# Patient Record
Sex: FEMALE | Race: OTHER | Employment: UNEMPLOYED | URBAN - METROPOLITAN AREA
[De-identification: names, ages, dates, MRNs, and addresses within clinical notes are randomized per-mention and may not be internally consistent; named-entity substitution may affect disease eponyms.]

---

## 2021-12-07 NOTE — PROGRESS NOTES
PATIENT IDENTIFICATION  Name: Carolina Troy  MRN: [id number]    Diagnoses:   Anxiety  (primary encounter diagnosis)  Fibromyalgia    SUBJECTIVE:  Carolina Troy is a 73year old female who presents for Establishment of care and anxiety follow up.       per NG:; 40 week 7 lb(s) Female   •       per N week 7 lb(s) 15 oz Male   •       per N week 7 lb(s) 15 oz Female   •       per N week 8 lb(s) Male     Social History    Tobacco Use      Smoking status: gabapentin alone. Per up to date guidelines, may benefit from the addition of SNRI (venlafaxine), will f/u in one week, may consider increasing venlafaxine dose at that time if tolerating well.  Will need to also consider decreasing alprazolam and/or gabape

## 2021-12-14 NOTE — PROGRESS NOTES
HPI:   Wilfred Garcia is a 73year old female who presents for a Medicare Subsequent Annual Wellness visit (Pt already had Initial Annual Wellness). No topic due editable text        She has been screened for Falls and is low risk.       Cognitive Asse for: CHOLEST, HDL, LDL, TRIG       Last Chemistry Labs:   No results found for: AST, ALT, CA, ALB, TSH, CREATSERUM, GLU     CBC  (most recent labs)   No results found for: WBC, HGB, PLT     ALLERGIES:   She has No Known Allergies.     CURRENT MEDICATIONS: difficulty urinating and dysuria. Musculoskeletal: Positive for back pain (hx of fibromyalgia). Skin: Negative for rash and wound. Allergic/Immunologic: Negative for environmental allergies and food allergies.    Neurological: Positive for headaches ( ameya ([id number]) 10/16/2018        ASSESSMENT AND OTHER RELEVANT CHRONIC CONDITIONS:   Hung Chamberlain is a 73year old female who presents for a Medicare Assessment.      PLAN SUMMARY:   Diagnoses and all orders for this visit:    Encounter for [facility] than 10 pounds without trying?: 2 - No  Has your appetite been poor?: No  How does the patient maintain a good energy level?: Other  How would you describe your daily physical activity?: Moderate  How would you describe your current health state?: Alex Conteh density screening    Covered every 2 years after age 72 if diagnosed with risk of osteoporosis or estrogen deficiency. Covered yearly for long-term glucocorticoid medication use (Steroids) No results found for this or any previous visit.       DEXA Scan

## 2021-12-14 NOTE — PATIENT INSTRUCTIONS
Fernando Dhillon's SCREENING SCHEDULE   Tests on this list are recommended by your physician but may not be covered, or covered at this frequency, by your insurer. Please check with your insurance carrier before scheduling to verify coverage.    PREVENT risk -  No recommendations at this time    Chlamydia Annually if high risk -  No recommendations at this time   Screening Mammogram    Mammogram     Recommend annually for all female patients aged 36 and older    One baseline mammogram covered for patients using their home computer and printer. (the forms are also available in [facility])  [email or web address]  This link also has information from the [facility] regarding Advance Directives.

## 2022-01-05 ENCOUNTER — TELEPHONE (OUTPATIENT)
Dept: INTERNAL MEDICINE CLINIC | Facility: CLINIC | Age: 67
End: 2022-01-05

## 2022-01-05 NOTE — TELEPHONE ENCOUNTER
Pt came in to the clinic to ask if she can get a refill on Alprazolam 0.5mg. Pt states she sometimes takes 2 instead of 1. If it can be sent asap to the pharmacy.

## 2022-01-06 NOTE — PROGRESS NOTES
Telehealth outside of [address] Verbal Consent   I conducted a telehealth visit with Margaret Hernandez today, 01/06/22, which was completed using two-way, real-time interactive audio communication.  This has been done in good neo to provide angelika feels like her anxiety is not well controlled. Willing to try another SSRI or SNRI again. Never went to see psych and has not been scheduled to see them either. Previously tried venlafaxine last month for a total of 3 days.    Has previously tried cy Take 1 capsule (40 mg total) by mouth daily. 90 capsule 1   • gabapentin 300 MG Oral Cap Take 1 capsule (300 mg total) by mouth 3 (three) times daily.  90 capsule 2   • atorvastatin 10 MG Oral Tab      • Cholecalciferol (VITAMIN D3) 1.25 MG (58297 UT) Oral

## 2022-01-06 NOTE — TELEPHONE ENCOUNTER
Contacted pt and informed needs appt to discuss this further.    Scheduled virtual today at [place] [address]

## 2022-01-10 NOTE — PROGRESS NOTES
Dear Dr. Daryn Puga:    I saw your patient Mercedes Garcia in consultation this afternoon at your request, for evaluation of chronic musculoskeletal pain and positive BRIDGER.   As you know, she is a 78-year-old woman who had fusion at C5-6, 13 years ago for hernia Her appetite can be poor. No weight loss. No rash. Her skin is not sensitive to the sun. Her hair has thinned some. No internal eye inflammation, oral or nasal ulcers, of adenopathy. No shortness of breath. She gets chest pain when she is anxious. gabapentin. She will try cyclobenzaprine 5 to 10 mg before bedtime. We discussed the process. Physical therapy. She will follow-up in 1 month. 4.  Anxiety, hypertension, prediabetes, high cholesterol, obesity.       Thank you for inviting me to parti

## 2022-01-15 NOTE — PROGRESS NOTES
PATIENT IDENTIFICATION  Name: Jude Albright  MRN: [id number]    Diagnoses:   Other chronic pain  (primary encounter diagnosis)  Anxiety    SUBJECTIVE:  Jude Albright is a 73year old female who presents for f/u for anxiety/chronic pain.      Seen by RACHAEL Release Take 1 capsule (40 mg total) by mouth daily. 90 capsule 1   • gabapentin 300 MG Oral Cap Take 1 capsule (300 mg total) by mouth 3 (three) times daily.  90 capsule 2   • atorvastatin 10 MG Oral Tab      • Cholecalciferol (VITAMIN D3) 1.25 MG (99872 U

## 2022-02-01 ENCOUNTER — TELEPHONE (OUTPATIENT)
Dept: INTERNAL MEDICINE CLINIC | Facility: CLINIC | Age: 67
End: 2022-02-01

## 2022-02-01 NOTE — TELEPHONE ENCOUNTER
Pt would like to know if she can get a refill for Alprazolam 0.5mg. Pt uses [facility] in [place].

## 2022-02-02 ENCOUNTER — VIRTUAL PHONE E/M (OUTPATIENT)
Dept: INTERNAL MEDICINE CLINIC | Facility: CLINIC | Age: 67
End: 2022-02-02
Payer: MEDICARE

## 2022-02-02 DIAGNOSIS — M79.7 FIBROMYALGIA: ICD-10-CM

## 2022-02-02 DIAGNOSIS — F32.A ANXIETY AND DEPRESSION: ICD-10-CM

## 2022-02-02 DIAGNOSIS — G89.29 OTHER CHRONIC PAIN: Primary | ICD-10-CM

## 2022-02-02 DIAGNOSIS — F41.9 ANXIETY AND DEPRESSION: ICD-10-CM

## 2022-02-02 PROCEDURE — 99443 PHONE E/M BY PHYS 21-30 MIN: CPT | Performed by: FAMILY MEDICINE

## 2022-02-02 RX ORDER — DULOXETIN HYDROCHLORIDE 30 MG/1
30 CAPSULE, DELAYED RELEASE ORAL DAILY
Qty: 30 CAPSULE | Refills: 3 | Status: SHIPPED | OUTPATIENT
Start: 2022-02-02 | End: 2022-04-04 | Stop reason: DRUGHIGH

## 2022-02-02 RX ORDER — ALPRAZOLAM 0.5 MG/1
0.5 TABLET ORAL NIGHTLY PRN
Qty: 30 TABLET | Refills: 0 | Status: SHIPPED | OUTPATIENT
Start: 2022-02-02 | End: 2022-03-02

## 2022-02-09 ENCOUNTER — OFFICE VISIT (OUTPATIENT)
Dept: INTERNAL MEDICINE CLINIC | Facility: CLINIC | Age: 67
End: 2022-02-09
Payer: MEDICARE

## 2022-02-09 VITALS
DIASTOLIC BLOOD PRESSURE: 80 MMHG | SYSTOLIC BLOOD PRESSURE: 128 MMHG | BODY MASS INDEX: 28.16 KG/M2 | OXYGEN SATURATION: 98 % | HEART RATE: 88 BPM | WEIGHT: 169 LBS | HEIGHT: 65 IN

## 2022-02-09 DIAGNOSIS — M79.7 FIBROMYALGIA: ICD-10-CM

## 2022-02-09 DIAGNOSIS — F33.0 MILD RECURRENT MAJOR DEPRESSION (HCC): Chronic | ICD-10-CM

## 2022-02-09 DIAGNOSIS — F41.9 ANXIETY: ICD-10-CM

## 2022-02-09 DIAGNOSIS — G89.29 OTHER CHRONIC PAIN: Primary | ICD-10-CM

## 2022-02-09 PROCEDURE — 99213 OFFICE O/P EST LOW 20 MIN: CPT | Performed by: FAMILY MEDICINE

## 2022-02-09 PROCEDURE — 3074F SYST BP LT 130 MM HG: CPT | Performed by: FAMILY MEDICINE

## 2022-02-09 PROCEDURE — 3008F BODY MASS INDEX DOCD: CPT | Performed by: FAMILY MEDICINE

## 2022-02-09 PROCEDURE — 3079F DIAST BP 80-89 MM HG: CPT | Performed by: FAMILY MEDICINE

## 2022-02-09 RX ORDER — ATORVASTATIN CALCIUM 10 MG/1
10 TABLET, FILM COATED ORAL NIGHTLY
Qty: 90 TABLET | Refills: 1 | Status: SHIPPED | OUTPATIENT
Start: 2022-02-09

## 2022-02-18 ENCOUNTER — OFFICE VISIT (OUTPATIENT)
Dept: RHEUMATOLOGY | Facility: CLINIC | Age: 67
End: 2022-02-18
Payer: MEDICARE

## 2022-02-18 VITALS
WEIGHT: 171 LBS | BODY MASS INDEX: 28 KG/M2 | SYSTOLIC BLOOD PRESSURE: 152 MMHG | DIASTOLIC BLOOD PRESSURE: 79 MMHG | HEART RATE: 73 BPM

## 2022-02-18 DIAGNOSIS — M15.9 PRIMARY OSTEOARTHRITIS INVOLVING MULTIPLE JOINTS: ICD-10-CM

## 2022-02-18 DIAGNOSIS — M79.7 FIBROMYALGIA: Primary | ICD-10-CM

## 2022-02-18 PROCEDURE — 3078F DIAST BP <80 MM HG: CPT | Performed by: INTERNAL MEDICINE

## 2022-02-18 PROCEDURE — 99213 OFFICE O/P EST LOW 20 MIN: CPT | Performed by: INTERNAL MEDICINE

## 2022-02-18 PROCEDURE — 3077F SYST BP >= 140 MM HG: CPT | Performed by: INTERNAL MEDICINE

## 2022-03-02 ENCOUNTER — OFFICE VISIT (OUTPATIENT)
Dept: INTERNAL MEDICINE CLINIC | Facility: CLINIC | Age: 67
End: 2022-03-02
Payer: MEDICARE

## 2022-03-02 VITALS
WEIGHT: 169 LBS | BODY MASS INDEX: 28.16 KG/M2 | HEIGHT: 65 IN | SYSTOLIC BLOOD PRESSURE: 118 MMHG | DIASTOLIC BLOOD PRESSURE: 70 MMHG | HEART RATE: 61 BPM | OXYGEN SATURATION: 97 %

## 2022-03-02 DIAGNOSIS — F41.9 ANXIETY: ICD-10-CM

## 2022-03-02 DIAGNOSIS — M79.7 FIBROMYALGIA: Primary | ICD-10-CM

## 2022-03-02 DIAGNOSIS — F33.0 MILD RECURRENT MAJOR DEPRESSION (HCC): ICD-10-CM

## 2022-03-02 PROCEDURE — 3078F DIAST BP <80 MM HG: CPT | Performed by: FAMILY MEDICINE

## 2022-03-02 PROCEDURE — 99214 OFFICE O/P EST MOD 30 MIN: CPT | Performed by: FAMILY MEDICINE

## 2022-03-02 PROCEDURE — 3074F SYST BP LT 130 MM HG: CPT | Performed by: FAMILY MEDICINE

## 2022-03-02 PROCEDURE — 3008F BODY MASS INDEX DOCD: CPT | Performed by: FAMILY MEDICINE

## 2022-03-02 RX ORDER — ALPRAZOLAM 0.25 MG/1
0.25 TABLET ORAL NIGHTLY PRN
Qty: 60 TABLET | Refills: 0 | Status: SHIPPED | OUTPATIENT
Start: 2022-03-02

## 2022-03-29 ENCOUNTER — ORDER TRANSCRIPTION (OUTPATIENT)
Dept: PHYSICAL THERAPY | Facility: HOSPITAL | Age: 67
End: 2022-03-29

## 2022-04-01 ENCOUNTER — OFFICE VISIT (OUTPATIENT)
Dept: GASTROENTEROLOGY | Facility: CLINIC | Age: 67
End: 2022-04-01
Payer: MEDICARE

## 2022-04-01 ENCOUNTER — TELEPHONE (OUTPATIENT)
Dept: GASTROENTEROLOGY | Facility: CLINIC | Age: 67
End: 2022-04-01

## 2022-04-01 VITALS
SYSTOLIC BLOOD PRESSURE: 122 MMHG | BODY MASS INDEX: 28.82 KG/M2 | WEIGHT: 173 LBS | HEART RATE: 90 BPM | DIASTOLIC BLOOD PRESSURE: 79 MMHG | HEIGHT: 65 IN

## 2022-04-01 DIAGNOSIS — Z12.11 ENCOUNTER FOR COLORECTAL CANCER SCREENING: Primary | ICD-10-CM

## 2022-04-01 DIAGNOSIS — K64.9 HEMORRHOIDS, UNSPECIFIED HEMORRHOID TYPE: ICD-10-CM

## 2022-04-01 DIAGNOSIS — Z12.12 ENCOUNTER FOR COLORECTAL CANCER SCREENING: Primary | ICD-10-CM

## 2022-04-01 DIAGNOSIS — Z86.010 PERSONAL HISTORY OF COLONIC POLYPS: ICD-10-CM

## 2022-04-01 PROCEDURE — 3074F SYST BP LT 130 MM HG: CPT | Performed by: INTERNAL MEDICINE

## 2022-04-01 PROCEDURE — 3078F DIAST BP <80 MM HG: CPT | Performed by: INTERNAL MEDICINE

## 2022-04-01 PROCEDURE — 3008F BODY MASS INDEX DOCD: CPT | Performed by: INTERNAL MEDICINE

## 2022-04-01 PROCEDURE — 99203 OFFICE O/P NEW LOW 30 MIN: CPT | Performed by: INTERNAL MEDICINE

## 2022-04-01 NOTE — TELEPHONE ENCOUNTER
Scheduled for:  Colonoscopy [address]  Provider Name:  Dr. Anibal Ernandez  Date:  5/25/2022  Location:  [facility]  Sedation:  MAC  Time:  9:30 am, (pt is aware that [facility] will call the day before to confirm arrival time)  Prep:  Golytely  Meds/Allergies Reconciled?:  Physician reviewed  Diagnosis with codes:  Screening for colon cancer Z12.11 & Hx of colon polyps Z86.010  Was patient informed to call insurance with codes (Y/N):  Yes  Referral sent?:  Yes  [facility] or [facility] [address] notified?:  Electronic case request was sent to Tomstewmaryan Mccormack via [employer]. Medication Orders:  N/A  Misc Orders:  Patient was informed that they will need a COVID 19 test prior to their procedure. Patient verbally understood & will await a phone call from [facility] to schedule. Further instructions given by staff: I provided patient with Welsh prep instruction sheet.

## 2022-04-04 ENCOUNTER — OFFICE VISIT (OUTPATIENT)
Dept: INTERNAL MEDICINE CLINIC | Facility: CLINIC | Age: 67
End: 2022-04-04
Payer: MEDICARE

## 2022-04-04 VITALS
OXYGEN SATURATION: 96 % | BODY MASS INDEX: 28.32 KG/M2 | SYSTOLIC BLOOD PRESSURE: 134 MMHG | WEIGHT: 170 LBS | HEIGHT: 65 IN | RESPIRATION RATE: 17 BRPM | DIASTOLIC BLOOD PRESSURE: 72 MMHG | HEART RATE: 77 BPM

## 2022-04-04 DIAGNOSIS — E55.9 VITAMIN D DEFICIENCY: ICD-10-CM

## 2022-04-04 DIAGNOSIS — F33.0 MILD RECURRENT MAJOR DEPRESSION (HCC): Chronic | ICD-10-CM

## 2022-04-04 DIAGNOSIS — Z00.00 ENCOUNTER FOR ANNUAL HEALTH EXAMINATION: Primary | ICD-10-CM

## 2022-04-04 DIAGNOSIS — M79.7 FIBROMYALGIA: ICD-10-CM

## 2022-04-04 DIAGNOSIS — R73.01 ABNORMAL FASTING GLUCOSE: ICD-10-CM

## 2022-04-04 DIAGNOSIS — F41.9 ANXIETY: ICD-10-CM

## 2022-04-04 DIAGNOSIS — Z78.0 POSTMENOPAUSAL: ICD-10-CM

## 2022-04-04 DIAGNOSIS — Z13.6 SCREENING FOR CARDIOVASCULAR CONDITION: ICD-10-CM

## 2022-04-04 DIAGNOSIS — Z12.31 VISIT FOR SCREENING MAMMOGRAM: ICD-10-CM

## 2022-04-04 DIAGNOSIS — I10 ESSENTIAL HYPERTENSION: ICD-10-CM

## 2022-04-04 DIAGNOSIS — Z13.220 SCREENING FOR LIPID DISORDERS: ICD-10-CM

## 2022-04-04 PROCEDURE — 3078F DIAST BP <80 MM HG: CPT | Performed by: FAMILY MEDICINE

## 2022-04-04 PROCEDURE — 3008F BODY MASS INDEX DOCD: CPT | Performed by: FAMILY MEDICINE

## 2022-04-04 PROCEDURE — G0438 PPPS, INITIAL VISIT: HCPCS | Performed by: FAMILY MEDICINE

## 2022-04-04 PROCEDURE — 3075F SYST BP GE 130 - 139MM HG: CPT | Performed by: FAMILY MEDICINE

## 2022-04-04 PROCEDURE — 96160 PT-FOCUSED HLTH RISK ASSMT: CPT | Performed by: FAMILY MEDICINE

## 2022-04-04 PROCEDURE — 99397 PER PM REEVAL EST PAT 65+ YR: CPT | Performed by: FAMILY MEDICINE

## 2022-04-05 LAB
ABSOLUTE BASOPHILS: 29 CELLS/UL (ref 0–200)
ABSOLUTE EOSINOPHILS: 221 CELLS/UL (ref 15–500)
ABSOLUTE LYMPHOCYTES: 1695 CELLS/UL (ref 850–3900)
ABSOLUTE MONOCYTES: 436 CELLS/UL (ref 200–950)
ABSOLUTE NEUTROPHILS: 2519 CELLS/UL (ref 1500–7800)
ALBUMIN/GLOBULIN RATIO: 1.7 (CALC) (ref 1–2.5)
ALBUMIN: 4.6 G/DL (ref 3.6–5.1)
ALKALINE PHOSPHATASE: 92 U/L (ref 37–153)
ALT: 21 U/L (ref 6–29)
AST: 20 U/L (ref 10–35)
BASOPHILS: 0.6 %
BILIRUBIN, TOTAL: 0.6 MG/DL (ref 0.2–1.2)
BUN: 17 MG/DL (ref 7–25)
CALCIUM: 10.1 MG/DL (ref 8.6–10.4)
CARBON DIOXIDE: 30 MMOL/L (ref 20–32)
CHLORIDE: 105 MMOL/L (ref 98–110)
CHOL/HDLC RATIO: 4.1 (CALC)
CHOLESTEROL, TOTAL: 239 MG/DL
CREATININE: 0.72 MG/DL (ref 0.5–0.99)
EGFR IF AFRICN AM: 102 ML/MIN/1.73M2
EGFR IF NONAFRICN AM: 88 ML/MIN/1.73M2
EOSINOPHILS: 4.5 %
GLOBULIN: 2.7 G/DL (CALC) (ref 1.9–3.7)
GLUCOSE: 128 MG/DL (ref 65–99)
HDL CHOLESTEROL: 58 MG/DL
HEMATOCRIT: 41 % (ref 35–45)
HEMOGLOBIN A1C: 6.4 % OF TOTAL HGB
HEMOGLOBIN: 13.8 G/DL (ref 11.7–15.5)
LDL-CHOLESTEROL: 145 MG/DL (CALC)
LYMPHOCYTES: 34.6 %
MCH: 27.9 PG (ref 27–33)
MCHC: 33.7 G/DL (ref 32–36)
MCV: 82.8 FL (ref 80–100)
MONOCYTES: 8.9 %
MPV: 10.4 FL (ref 7.5–12.5)
NEUTROPHILS: 51.4 %
NON-HDL CHOLESTEROL: 181 MG/DL (CALC)
PLATELET COUNT: 184 THOUSAND/UL (ref 140–400)
POTASSIUM: 4.2 MMOL/L (ref 3.5–5.3)
PROTEIN, TOTAL: 7.3 G/DL (ref 6.1–8.1)
RDW: 12.5 % (ref 11–15)
RED BLOOD CELL COUNT: 4.95 MILLION/UL (ref 3.8–5.1)
SODIUM: 141 MMOL/L (ref 135–146)
TRIGLYCERIDES: 223 MG/DL
VITAMIN D, 25-OH, TOTAL: 56 NG/ML (ref 30–100)
WHITE BLOOD CELL COUNT: 4.9 THOUSAND/UL (ref 3.8–10.8)

## 2022-05-02 ENCOUNTER — TELEPHONE (OUTPATIENT)
Dept: PHYSICAL THERAPY | Facility: HOSPITAL | Age: 67
End: 2022-05-02

## 2022-05-03 ENCOUNTER — APPOINTMENT (OUTPATIENT)
Dept: PHYSICAL THERAPY | Age: 67
End: 2022-05-03

## 2022-05-03 ENCOUNTER — TELEPHONE (OUTPATIENT)
Dept: PHYSICAL THERAPY | Facility: HOSPITAL | Age: 67
End: 2022-05-03

## 2022-05-05 ENCOUNTER — APPOINTMENT (OUTPATIENT)
Dept: PHYSICAL THERAPY | Age: 67
End: 2022-05-05

## 2022-05-05 ENCOUNTER — OFFICE VISIT (OUTPATIENT)
Dept: INTERNAL MEDICINE CLINIC | Facility: CLINIC | Age: 67
End: 2022-05-05
Payer: MEDICARE

## 2022-05-05 VITALS
HEIGHT: 65 IN | WEIGHT: 170 LBS | SYSTOLIC BLOOD PRESSURE: 138 MMHG | DIASTOLIC BLOOD PRESSURE: 80 MMHG | BODY MASS INDEX: 28.32 KG/M2 | HEART RATE: 75 BPM

## 2022-05-05 DIAGNOSIS — F33.0 MILD RECURRENT MAJOR DEPRESSION (HCC): ICD-10-CM

## 2022-05-05 DIAGNOSIS — F41.9 ANXIETY: ICD-10-CM

## 2022-05-05 DIAGNOSIS — M79.7 FIBROMYALGIA: Primary | ICD-10-CM

## 2022-05-05 PROCEDURE — 99214 OFFICE O/P EST MOD 30 MIN: CPT | Performed by: FAMILY MEDICINE

## 2022-05-05 PROCEDURE — 3079F DIAST BP 80-89 MM HG: CPT | Performed by: FAMILY MEDICINE

## 2022-05-05 PROCEDURE — 3008F BODY MASS INDEX DOCD: CPT | Performed by: FAMILY MEDICINE

## 2022-05-05 PROCEDURE — 3075F SYST BP GE 130 - 139MM HG: CPT | Performed by: FAMILY MEDICINE

## 2022-05-05 RX ORDER — DULOXETIN HYDROCHLORIDE 60 MG/1
60 CAPSULE, DELAYED RELEASE ORAL DAILY
Qty: 90 CAPSULE | Refills: 1 | Status: SHIPPED | OUTPATIENT
Start: 2022-05-05

## 2022-05-05 RX ORDER — DULOXETIN HYDROCHLORIDE 20 MG/1
CAPSULE, DELAYED RELEASE ORAL
COMMUNITY
Start: 2022-05-01 | End: 2022-05-05 | Stop reason: DRUGHIGH

## 2022-05-05 RX ORDER — ALPRAZOLAM 0.25 MG/1
0.25 TABLET ORAL NIGHTLY PRN
Qty: 30 TABLET | Refills: 0 | Status: SHIPPED | OUTPATIENT
Start: 2022-05-05

## 2022-05-10 ENCOUNTER — APPOINTMENT (OUTPATIENT)
Dept: PHYSICAL THERAPY | Age: 67
End: 2022-05-10
Attending: PHYSICAL THERAPIST

## 2022-05-12 ENCOUNTER — APPOINTMENT (OUTPATIENT)
Dept: PHYSICAL THERAPY | Age: 67
End: 2022-05-12
Attending: PHYSICAL THERAPIST

## 2022-05-17 ENCOUNTER — APPOINTMENT (OUTPATIENT)
Dept: PHYSICAL THERAPY | Age: 67
End: 2022-05-17
Attending: PHYSICAL THERAPIST

## 2022-05-19 ENCOUNTER — APPOINTMENT (OUTPATIENT)
Dept: PHYSICAL THERAPY | Age: 67
End: 2022-05-19
Attending: PHYSICAL THERAPIST

## 2022-05-24 ENCOUNTER — APPOINTMENT (OUTPATIENT)
Dept: PHYSICAL THERAPY | Age: 67
End: 2022-05-24
Attending: PHYSICAL THERAPIST

## 2022-05-25 ENCOUNTER — SURGERY CENTER DOCUMENTATION (OUTPATIENT)
Dept: SURGERY | Age: 67
End: 2022-05-25

## 2022-05-25 ENCOUNTER — LAB REQUISITION (OUTPATIENT)
Dept: SURGERY | Age: 67
End: 2022-05-25
Payer: MEDICARE

## 2022-05-25 DIAGNOSIS — Z12.11 ENCOUNTER FOR COLORECTAL CANCER SCREENING: ICD-10-CM

## 2022-05-25 DIAGNOSIS — Z86.010 PERSONAL HISTORY OF COLONIC POLYPS: ICD-10-CM

## 2022-05-25 DIAGNOSIS — Z12.12 ENCOUNTER FOR COLORECTAL CANCER SCREENING: ICD-10-CM

## 2022-05-25 DIAGNOSIS — Z12.11 SPECIAL SCREENING FOR MALIGNANT NEOPLASMS, COLON: ICD-10-CM

## 2022-05-25 PROCEDURE — 45380 COLONOSCOPY AND BIOPSY: CPT | Performed by: INTERNAL MEDICINE

## 2022-05-25 PROCEDURE — 88305 TISSUE EXAM BY PATHOLOGIST: CPT | Performed by: INTERNAL MEDICINE

## 2022-05-26 ENCOUNTER — APPOINTMENT (OUTPATIENT)
Dept: PHYSICAL THERAPY | Age: 67
End: 2022-05-26
Attending: PHYSICAL THERAPIST

## 2022-05-27 ENCOUNTER — TELEPHONE (OUTPATIENT)
Dept: GASTROENTEROLOGY | Facility: CLINIC | Age: 67
End: 2022-05-27

## 2022-05-27 ENCOUNTER — APPOINTMENT (OUTPATIENT)
Dept: PHYSICAL THERAPY | Age: 67
End: 2022-05-27
Attending: PHYSICAL THERAPIST

## 2022-05-27 NOTE — TELEPHONE ENCOUNTER
Kandis Perry MD  [facility] Clinical Staff  GI staff: please place recall for colonoscopy in 7 years       Results letter printed and mailed out to patient. Health maintenance updated to reflect 7 year colonoscopy recall. Patient outreach entered for 7 year colonoscopy recall. Done on 5/25/2022; due on 5/25/2029.

## 2022-05-31 ENCOUNTER — OFFICE VISIT (OUTPATIENT)
Dept: INTERNAL MEDICINE CLINIC | Facility: CLINIC | Age: 67
End: 2022-05-31
Payer: MEDICARE

## 2022-05-31 VITALS
OXYGEN SATURATION: 98 % | SYSTOLIC BLOOD PRESSURE: 112 MMHG | DIASTOLIC BLOOD PRESSURE: 60 MMHG | BODY MASS INDEX: 28.32 KG/M2 | RESPIRATION RATE: 16 BRPM | HEIGHT: 65 IN | WEIGHT: 170 LBS | HEART RATE: 79 BPM

## 2022-05-31 DIAGNOSIS — R07.89 ATYPICAL CHEST PAIN: ICD-10-CM

## 2022-05-31 DIAGNOSIS — I49.9 IRREGULAR CARDIAC RHYTHM: Primary | ICD-10-CM

## 2022-05-31 PROCEDURE — 3008F BODY MASS INDEX DOCD: CPT | Performed by: FAMILY MEDICINE

## 2022-05-31 PROCEDURE — 3074F SYST BP LT 130 MM HG: CPT | Performed by: FAMILY MEDICINE

## 2022-05-31 PROCEDURE — 3078F DIAST BP <80 MM HG: CPT | Performed by: FAMILY MEDICINE

## 2022-05-31 PROCEDURE — 99214 OFFICE O/P EST MOD 30 MIN: CPT | Performed by: FAMILY MEDICINE

## 2022-05-31 RX ORDER — ALPRAZOLAM 0.25 MG/1
0.25 TABLET ORAL NIGHTLY PRN
Qty: 30 TABLET | Refills: 0 | Status: SHIPPED | OUTPATIENT
Start: 2022-05-31

## 2022-06-23 DIAGNOSIS — I10 ESSENTIAL HYPERTENSION: ICD-10-CM

## 2022-06-24 RX ORDER — METOPROLOL SUCCINATE 50 MG/1
50 TABLET, EXTENDED RELEASE ORAL DAILY
Qty: 90 TABLET | Refills: 1 | Status: SHIPPED | OUTPATIENT
Start: 2022-06-24

## 2022-06-24 RX ORDER — ATORVASTATIN CALCIUM 10 MG/1
10 TABLET, FILM COATED ORAL NIGHTLY
Qty: 90 TABLET | Refills: 1 | OUTPATIENT
Start: 2022-06-24

## 2022-06-27 ENCOUNTER — OFFICE VISIT (OUTPATIENT)
Dept: INTERNAL MEDICINE CLINIC | Facility: CLINIC | Age: 67
End: 2022-06-27
Payer: MEDICARE

## 2022-06-27 VITALS
RESPIRATION RATE: 15 BRPM | BODY MASS INDEX: 28.32 KG/M2 | SYSTOLIC BLOOD PRESSURE: 120 MMHG | WEIGHT: 170 LBS | HEART RATE: 81 BPM | DIASTOLIC BLOOD PRESSURE: 76 MMHG | HEIGHT: 65 IN

## 2022-06-27 DIAGNOSIS — F41.9 ANXIETY: ICD-10-CM

## 2022-06-27 DIAGNOSIS — G44.209 TENSION HEADACHE: ICD-10-CM

## 2022-06-27 DIAGNOSIS — R73.01 ABNORMAL FASTING GLUCOSE: ICD-10-CM

## 2022-06-27 DIAGNOSIS — E78.2 MIXED HYPERLIPIDEMIA: Primary | ICD-10-CM

## 2022-06-27 DIAGNOSIS — M79.7 FIBROMYALGIA: ICD-10-CM

## 2022-06-27 DIAGNOSIS — F51.04 PSYCHOPHYSIOLOGICAL INSOMNIA: ICD-10-CM

## 2022-06-27 PROCEDURE — 3078F DIAST BP <80 MM HG: CPT | Performed by: FAMILY MEDICINE

## 2022-06-27 PROCEDURE — 3008F BODY MASS INDEX DOCD: CPT | Performed by: FAMILY MEDICINE

## 2022-06-27 PROCEDURE — 3074F SYST BP LT 130 MM HG: CPT | Performed by: FAMILY MEDICINE

## 2022-06-27 PROCEDURE — 99214 OFFICE O/P EST MOD 30 MIN: CPT | Performed by: FAMILY MEDICINE

## 2022-06-27 RX ORDER — CYCLOBENZAPRINE HCL 5 MG
TABLET ORAL
Qty: 8 TABLET | Refills: 2 | Status: SHIPPED | OUTPATIENT
Start: 2022-06-27

## 2022-06-27 RX ORDER — ALPRAZOLAM 0.25 MG/1
0.25 TABLET ORAL 2 TIMES DAILY PRN
Qty: 60 TABLET | Refills: 0 | Status: SHIPPED | OUTPATIENT
Start: 2022-06-27

## 2022-06-27 RX ORDER — GABAPENTIN 300 MG/1
300 CAPSULE ORAL 3 TIMES DAILY
Qty: 90 CAPSULE | Refills: 2 | Status: SHIPPED | OUTPATIENT
Start: 2022-06-27

## 2022-06-27 RX ORDER — DULOXETIN HYDROCHLORIDE 60 MG/1
60 CAPSULE, DELAYED RELEASE ORAL DAILY
Qty: 90 CAPSULE | Refills: 1 | Status: SHIPPED | OUTPATIENT
Start: 2022-06-27

## 2022-08-09 RX ORDER — ALPRAZOLAM 0.25 MG/1
0.25 TABLET ORAL 2 TIMES DAILY PRN
Qty: 60 TABLET | Refills: 0 | OUTPATIENT
Start: 2022-08-09

## 2022-09-07 NOTE — TELEPHONE ENCOUNTER
Pt called and is requesting a refill of:    ALPRAZolam 0.25 MG Oral Tab    She is making an appt today with Dr. Shemar Baigr.

## 2022-09-08 NOTE — TELEPHONE ENCOUNTER
Last OV:6-27-22  Last refill:6-27-22    Next Appt:    With [address] Adrianne Gillette MD)  09/21/2022 at 2:40 PM

## 2022-09-09 RX ORDER — ALPRAZOLAM 0.25 MG/1
0.25 TABLET ORAL NIGHTLY PRN
Qty: 30 TABLET | Refills: 0 | OUTPATIENT
Start: 2022-09-09

## 2022-09-21 ENCOUNTER — TELEPHONE (OUTPATIENT)
Dept: INTERNAL MEDICINE CLINIC | Facility: CLINIC | Age: 67
End: 2022-09-21

## 2022-09-21 ENCOUNTER — OFFICE VISIT (OUTPATIENT)
Dept: INTERNAL MEDICINE CLINIC | Facility: CLINIC | Age: 67
End: 2022-09-21

## 2022-09-21 VITALS
BODY MASS INDEX: 29.85 KG/M2 | HEART RATE: 79 BPM | HEIGHT: 64.5 IN | TEMPERATURE: 98 F | OXYGEN SATURATION: 97 % | DIASTOLIC BLOOD PRESSURE: 66 MMHG | SYSTOLIC BLOOD PRESSURE: 122 MMHG | WEIGHT: 177 LBS

## 2022-09-21 DIAGNOSIS — Z76.89 ESTABLISHING CARE WITH NEW DOCTOR, ENCOUNTER FOR: Primary | ICD-10-CM

## 2022-09-21 DIAGNOSIS — Z11.59 NEED FOR HEPATITIS C SCREENING TEST: ICD-10-CM

## 2022-09-21 DIAGNOSIS — M79.7 FIBROMYALGIA: ICD-10-CM

## 2022-09-21 DIAGNOSIS — Z13.21 ENCOUNTER FOR VITAMIN DEFICIENCY SCREENING: ICD-10-CM

## 2022-09-21 DIAGNOSIS — K21.9 GASTROESOPHAGEAL REFLUX DISEASE, UNSPECIFIED WHETHER ESOPHAGITIS PRESENT: ICD-10-CM

## 2022-09-21 DIAGNOSIS — R73.03 PREDIABETES: ICD-10-CM

## 2022-09-21 DIAGNOSIS — G89.29 OTHER CHRONIC PAIN: ICD-10-CM

## 2022-09-21 DIAGNOSIS — F13.20 BENZODIAZEPINE DEPENDENCE (HCC): ICD-10-CM

## 2022-09-21 DIAGNOSIS — Z00.00 PREVENTATIVE HEALTH CARE: ICD-10-CM

## 2022-09-21 PROCEDURE — 3078F DIAST BP <80 MM HG: CPT | Performed by: INTERNAL MEDICINE

## 2022-09-21 PROCEDURE — 3074F SYST BP LT 130 MM HG: CPT | Performed by: INTERNAL MEDICINE

## 2022-09-21 PROCEDURE — 99205 OFFICE O/P NEW HI 60 MIN: CPT | Performed by: INTERNAL MEDICINE

## 2022-09-21 PROCEDURE — 3008F BODY MASS INDEX DOCD: CPT | Performed by: INTERNAL MEDICINE

## 2022-09-21 PROCEDURE — 90677 PCV20 VACCINE IM: CPT | Performed by: INTERNAL MEDICINE

## 2022-09-21 RX ORDER — ALPRAZOLAM 0.25 MG/1
0.25 TABLET ORAL DAILY PRN
Qty: 30 TABLET | Refills: 0 | Status: SHIPPED | OUTPATIENT
Start: 2022-09-21

## 2022-09-21 RX ORDER — DULOXETIN HYDROCHLORIDE 60 MG/1
60 CAPSULE, DELAYED RELEASE ORAL DAILY
Qty: 90 CAPSULE | Refills: 1 | Status: SHIPPED | OUTPATIENT
Start: 2022-09-21

## 2022-09-21 NOTE — TELEPHONE ENCOUNTER
Pt forgot to request a refill for Omeprazole 40 MG Oral Capsule Delayed Release  To please inform her when this has been sent

## 2022-09-22 RX ORDER — OMEPRAZOLE 40 MG/1
40 CAPSULE, DELAYED RELEASE ORAL DAILY
Qty: 90 CAPSULE | Refills: 1 | Status: SHIPPED | OUTPATIENT
Start: 2022-09-22

## 2022-09-23 ENCOUNTER — NURSE ONLY (OUTPATIENT)
Dept: INTERNAL MEDICINE CLINIC | Facility: CLINIC | Age: 67
End: 2022-09-23

## 2022-09-23 NOTE — PROGRESS NOTES
VITAMIN D,LIPID,A1C,HCV,CMP,and CBC labs drawn per Dr Hudson Mom orders, Patient tolerated lab draw well

## 2022-09-24 LAB
ABSOLUTE BASOPHILS: 32 CELLS/UL (ref 0–200)
ABSOLUTE EOSINOPHILS: 159 CELLS/UL (ref 15–500)
ABSOLUTE LYMPHOCYTES: 1797 CELLS/UL (ref 850–3900)
ABSOLUTE MONOCYTES: 429 CELLS/UL (ref 200–950)
ABSOLUTE NEUTROPHILS: 2883 CELLS/UL (ref 1500–7800)
ALBUMIN/GLOBULIN RATIO: 1.7 (CALC) (ref 1–2.5)
ALBUMIN: 4.4 G/DL (ref 3.6–5.1)
ALKALINE PHOSPHATASE: 101 U/L (ref 37–153)
ALT: 22 U/L (ref 6–29)
AST: 20 U/L (ref 10–35)
BASOPHILS: 0.6 %
BILIRUBIN, TOTAL: 0.5 MG/DL (ref 0.2–1.2)
BUN: 14 MG/DL (ref 7–25)
CALCIUM: 9.8 MG/DL (ref 8.6–10.4)
CARBON DIOXIDE: 28 MMOL/L (ref 20–32)
CHLORIDE: 104 MMOL/L (ref 98–110)
CHOL/HDLC RATIO: 2.8 (CALC)
CHOLESTEROL, TOTAL: 172 MG/DL
CREATININE: 0.62 MG/DL (ref 0.5–1.05)
EGFR: 98 ML/MIN/1.73M2
EOSINOPHILS: 3 %
GLOBULIN: 2.6 G/DL (CALC) (ref 1.9–3.7)
GLUCOSE: 164 MG/DL (ref 65–99)
HDL CHOLESTEROL: 61 MG/DL
HEMATOCRIT: 40.8 % (ref 35–45)
HEMOGLOBIN A1C: 7 % OF TOTAL HGB
HEMOGLOBIN: 13.5 G/DL (ref 11.7–15.5)
LDL-CHOLESTEROL: 79 MG/DL (CALC)
LYMPHOCYTES: 33.9 %
MCH: 27.7 PG (ref 27–33)
MCHC: 33.1 G/DL (ref 32–36)
MCV: 83.6 FL (ref 80–100)
MONOCYTES: 8.1 %
MPV: 10.2 FL (ref 7.5–12.5)
NEUTROPHILS: 54.4 %
NON-HDL CHOLESTEROL: 111 MG/DL (CALC)
PLATELET COUNT: 173 THOUSAND/UL (ref 140–400)
POTASSIUM: 4.2 MMOL/L (ref 3.5–5.3)
PROTEIN, TOTAL: 7 G/DL (ref 6.1–8.1)
RDW: 12.2 % (ref 11–15)
RED BLOOD CELL COUNT: 4.88 MILLION/UL (ref 3.8–5.1)
SIGNAL TO CUT-OFF: 0.05
SODIUM: 140 MMOL/L (ref 135–146)
TRIGLYCERIDES: 219 MG/DL
VITAMIN D, 25-OH, TOTAL: 28 NG/ML (ref 30–100)
WHITE BLOOD CELL COUNT: 5.3 THOUSAND/UL (ref 3.8–10.8)

## 2022-10-02 NOTE — PROGRESS NOTES
Fernando is a 69-year-old woman who I first saw for Dr. Madhu Multani on January 10th of 2022, with osteoarthritis, status post cervical spine fusion, and diffuse myofascial pain syndrome, with nonrestorative sleep and fatigue. A  was again on the telephone line with us, Brayden W Tavon Padilla, [phone]. Since her last visit 2/18/2022, she tried cyclobenzaprine 10 mg before bedtime. It did not help at first, but the lots is effectiveness, so she stopped. Her other physicians have her on duloxetine 60 mg a day, and alprazolam occasionally for panic attacks. She comes in not doing well. She is taking gabapentin 300 mg 3 times a day. Working with a chiropractor has helped. She never worked with physical therapy, because she went to [facility] after her last visit. She is hurting severely all over. Labs were done at [facility] September 23rd of 2022. CBC was normal.  CMP was normal, except glucose of 164. Hepatitis C negative. Hemoglobin A1c 7.0.  25 hydroxy vitamin D was 28. Review of Systems:   Constitutional: Negative for fever. Respiratory: Negative for shortness of breath. Cardiovascular: Negative for chest pain. Gastrointestinal: Negative for abdominal pain. Skin: Negative for rash. Hematological: Negative for adenopathy. Allergies:No Known Allergies. Physical Exam:  Pleasant woman in no acute distress. Blood pressure 118/73, pulse 76, height 5' 5\" (1.651 m), weight 179 lb (81.2 kg), not currently breastfeeding. HENT:      Head: Normocephalic. Eyes:      Pupils: Pupils are equal, round, and reactive to light. Cardiovascular:      Rate and Rhythm: Normal rate and regular rhythm. Pulses: Normal pulses. Heart sounds: Normal heart sounds. Pulmonary:      Effort: Pulmonary effort is normal.      Breath sounds: Normal breath sounds. Abdominal:      Tenderness: There is no abdominal tenderness.    Musculoskeletal:      Comments: She has diffuse myofascial discomfort to palpation. Skin:     Findings: No rash. Neurological:      Mental Status: She is oriented to person, place, and time. Assessment & Plan:     1. Her borderline negative BRIDGER is not clinically significant. Her specific antibodies are negative. She does not have lupus. 2.  Osteoarthritis. She is status post cervical fusion. She will now schedule her first appointment with physical therapy, for help in establishing an exercise program.  She will come to [place].    3.  Chronic diffuse myofascial pain syndrome, nonrestorative sleep, and fatigue. Gabapentin was increased to 400 mg 3 times a day. Physical therapy. She will follow-up in 3 months. 4.  Anxiety, hypertension, prediabetes, high cholesterol, obesity.

## 2022-10-03 ENCOUNTER — OFFICE VISIT (OUTPATIENT)
Dept: RHEUMATOLOGY | Facility: CLINIC | Age: 67
End: 2022-10-03
Payer: MEDICARE

## 2022-10-03 VITALS
DIASTOLIC BLOOD PRESSURE: 73 MMHG | SYSTOLIC BLOOD PRESSURE: 118 MMHG | BODY MASS INDEX: 29.82 KG/M2 | HEIGHT: 65 IN | WEIGHT: 179 LBS | HEART RATE: 76 BPM

## 2022-10-03 DIAGNOSIS — M15.9 PRIMARY OSTEOARTHRITIS INVOLVING MULTIPLE JOINTS: ICD-10-CM

## 2022-10-03 DIAGNOSIS — M79.7 FIBROMYALGIA: Primary | ICD-10-CM

## 2022-10-03 PROCEDURE — 99213 OFFICE O/P EST LOW 20 MIN: CPT | Performed by: INTERNAL MEDICINE

## 2022-10-03 RX ORDER — GABAPENTIN 400 MG/1
CAPSULE ORAL
Qty: 270 CAPSULE | Refills: 3 | Status: SHIPPED | OUTPATIENT
Start: 2022-10-03

## 2022-10-04 ENCOUNTER — TELEPHONE (OUTPATIENT)
Dept: PHYSICAL THERAPY | Facility: HOSPITAL | Age: 67
End: 2022-10-04

## 2022-10-11 ENCOUNTER — TELEPHONE (OUTPATIENT)
Dept: INTERNAL MEDICINE CLINIC | Facility: CLINIC | Age: 67
End: 2022-10-11

## 2022-10-11 DIAGNOSIS — E11.9 DIABETES MELLITUS WITHOUT COMPLICATION (HCC): Primary | ICD-10-CM

## 2022-10-11 RX ORDER — ATORVASTATIN CALCIUM 10 MG/1
10 TABLET, FILM COATED ORAL NIGHTLY
Qty: 90 TABLET | Refills: 1 | Status: SHIPPED | OUTPATIENT
Start: 2022-10-11

## 2022-10-11 NOTE — TELEPHONE ENCOUNTER
Pt calling for lab test results, some that were normal (CBC, CMP, HCV neg) and some with worsening elevation (Lipids, AIC, Glucose). Routed to Dr Velma Bush.

## 2022-10-11 NOTE — TELEPHONE ENCOUNTER
Results were discussed with patient and is willing to start Metformin, Rx was sent in. Referral got switched to Dr Anh Erazo since Dr Radha Rabago is out of network, and a copy was mailed to her as per her request.  Patient was advised to follow up in 3 months, Rx for atorvastatin was also authorized. ----- Message from Jayson Tucker MD sent at 10/11/2022  1:00 PM CDT -----  Please inform him that his labs reviewed. CBC normal.  A1C increased now in diabetic range. Recommend starting metformin 500 mg BID. If he is in agreement sent enough for 3 months. I recommend establishing with diabetic educator. Please place referral.  Ensure he is taking is statin medication. Needs to see ophthalmology. Please refer to Dr. Radha Rabago for diabetic eye exam.     Vit D is only mildly sub optimal.  No need for supplementation at this point. Follow up in 3 months to see me.

## 2022-10-12 ENCOUNTER — TELEPHONE (OUTPATIENT)
Dept: INTERNAL MEDICINE CLINIC | Facility: CLINIC | Age: 67
End: 2022-10-12

## 2022-10-12 NOTE — TELEPHONE ENCOUNTER
Pt calling requesting a \"blood sugar monitor\", just established w/ Dr Napier Resides in September. No record of having been prescribed a glucose monitor or test strips in the past.  Taking metformin, no insulin. Last A1c 9/21/22 : 6.4    Will route request to dr Napier Resides.

## 2022-10-13 RX ORDER — LANCETS 28 GAUGE
1 EACH MISCELLANEOUS 2 TIMES DAILY
Qty: 100 EACH | Refills: 2 | Status: SHIPPED | OUTPATIENT
Start: 2022-10-13

## 2022-10-13 RX ORDER — BLOOD-GLUCOSE METER
1 KIT MISCELLANEOUS 2 TIMES DAILY
Qty: 1 KIT | Refills: 0 | COMMUNITY
Start: 2022-10-13 | End: 2023-10-13

## 2022-10-13 RX ORDER — BLOOD-GLUCOSE METER
KIT MISCELLANEOUS
Qty: 100 STRIP | Refills: 2 | Status: SHIPPED | OUTPATIENT
Start: 2022-10-13 | End: 2023-10-13

## 2022-10-19 ENCOUNTER — TELEPHONE (OUTPATIENT)
Dept: INTERNAL MEDICINE CLINIC | Facility: CLINIC | Age: 67
End: 2022-10-19

## 2022-10-19 NOTE — TELEPHONE ENCOUNTER
Pt called stating that she has horrible neck pain that goes up to her neck and feels like a burning sensation  Pt wants to know what doctor recommends    Please advise

## 2022-10-20 ENCOUNTER — HOSPITAL ENCOUNTER (OUTPATIENT)
Dept: GENERAL RADIOLOGY | Facility: HOSPITAL | Age: 67
Discharge: HOME | End: 2022-10-20
Attending: INTERNAL MEDICINE
Payer: MEDICARE

## 2022-10-20 ENCOUNTER — OFFICE VISIT (OUTPATIENT)
Dept: INTERNAL MEDICINE CLINIC | Facility: CLINIC | Age: 67
End: 2022-10-20
Payer: MEDICARE

## 2022-10-20 VITALS
BODY MASS INDEX: 29 KG/M2 | HEART RATE: 94 BPM | OXYGEN SATURATION: 99 % | TEMPERATURE: 98 F | SYSTOLIC BLOOD PRESSURE: 108 MMHG | WEIGHT: 175 LBS | DIASTOLIC BLOOD PRESSURE: 60 MMHG

## 2022-10-20 DIAGNOSIS — G89.29 OTHER CHRONIC PAIN: ICD-10-CM

## 2022-10-20 DIAGNOSIS — M50.90 CERVICAL NECK PAIN WITH EVIDENCE OF DISC DISEASE: ICD-10-CM

## 2022-10-20 DIAGNOSIS — M50.90 CERVICAL NECK PAIN WITH EVIDENCE OF DISC DISEASE: Primary | ICD-10-CM

## 2022-10-20 PROCEDURE — 72050 X-RAY EXAM NECK SPINE 4/5VWS: CPT | Performed by: INTERNAL MEDICINE

## 2022-10-20 PROCEDURE — 99215 OFFICE O/P EST HI 40 MIN: CPT | Performed by: INTERNAL MEDICINE

## 2022-10-20 RX ORDER — BLOOD-GLUCOSE METER
1 KIT MISCELLANEOUS 2 TIMES DAILY
Qty: 1 KIT | Refills: 0 | Status: SHIPPED | OUTPATIENT
Start: 2022-10-20 | End: 2022-10-25

## 2022-10-20 RX ORDER — CYCLOBENZAPRINE HCL 5 MG
5 TABLET ORAL NIGHTLY PRN
Qty: 90 TABLET | Refills: 0 | Status: SHIPPED | OUTPATIENT
Start: 2022-10-20

## 2022-10-20 RX ORDER — NAPROXEN 500 MG/1
500 TABLET ORAL 2 TIMES DAILY PRN
Qty: 60 TABLET | Refills: 0 | Status: SHIPPED | OUTPATIENT
Start: 2022-10-20

## 2022-10-20 RX ORDER — LANCETS 28 GAUGE
1 EACH MISCELLANEOUS 2 TIMES DAILY
Qty: 100 EACH | Refills: 2 | Status: SHIPPED | OUTPATIENT
Start: 2022-10-20 | End: 2022-10-25

## 2022-10-20 RX ORDER — HYDROCHLOROTHIAZIDE 12.5 MG/1
TABLET ORAL
COMMUNITY
Start: 2022-09-19

## 2022-10-20 NOTE — TELEPHONE ENCOUNTER
Returned call to patient utilizing an interpretor [phone]). Patient states neck and head pain level is 10/10 x 3-4 days w/o any provocation event and while taking Gabapentin 300 mg TID as well as Duloxetine daily. The only relief in pain she can obtain is in taking Advil 800 mg, but the relief is only then for a 2 hr duration. Pt saw Rheumatologist 1 wk ago complainig of same pain (hx osteoarthritis/ fibromyalgia) and provider recommended she start Physical Therapy sessions. Pt has appt to initiate PT on 11/13/22. No pain Rx provided by Rheumologist.    Pt advised I will discuss w/ Dr Parley Nageotte and return call to her this morning given no appts open.

## 2022-10-25 ENCOUNTER — TELEPHONE (OUTPATIENT)
Dept: INTERNAL MEDICINE CLINIC | Facility: CLINIC | Age: 67
End: 2022-10-25

## 2022-10-25 RX ORDER — BLOOD-GLUCOSE METER
1 EACH MISCELLANEOUS 2 TIMES DAILY
Qty: 1 KIT | Refills: 0 | Status: SHIPPED | OUTPATIENT
Start: 2022-10-25 | End: 2023-10-25

## 2022-10-25 RX ORDER — BLOOD SUGAR DIAGNOSTIC
STRIP MISCELLANEOUS
Qty: 100 STRIP | Refills: 2 | Status: SHIPPED | OUTPATIENT
Start: 2022-10-25 | End: 2023-10-25

## 2022-10-25 RX ORDER — LANCETS
1 EACH MISCELLANEOUS 2 TIMES DAILY
Qty: 100 EACH | Refills: 2 | Status: SHIPPED | OUTPATIENT
Start: 2022-10-25 | End: 2023-10-25

## 2022-11-03 ENCOUNTER — TELEPHONE (OUTPATIENT)
Dept: INTERNAL MEDICINE CLINIC | Facility: CLINIC | Age: 67
End: 2022-11-03

## 2022-11-03 RX ORDER — ALPRAZOLAM 0.25 MG/1
0.25 TABLET ORAL DAILY PRN
Qty: 30 TABLET | Refills: 0 | Status: CANCELLED | OUTPATIENT
Start: 2022-11-03

## 2022-11-07 RX ORDER — ALPRAZOLAM 0.25 MG/1
0.25 TABLET ORAL DAILY PRN
Qty: 30 TABLET | Refills: 0 | Status: SHIPPED | OUTPATIENT
Start: 2022-11-07

## 2022-11-11 ENCOUNTER — TELEPHONE (OUTPATIENT)
Dept: PHYSICAL THERAPY | Facility: HOSPITAL | Age: 67
End: 2022-11-11

## 2022-11-15 ENCOUNTER — OFFICE VISIT (OUTPATIENT)
Dept: PHYSICAL THERAPY | Facility: HOSPITAL | Age: 67
End: 2022-11-15
Attending: INTERNAL MEDICINE
Payer: MEDICARE

## 2022-11-15 DIAGNOSIS — M79.7 FIBROMYALGIA: ICD-10-CM

## 2022-11-15 DIAGNOSIS — M15.9 PRIMARY OSTEOARTHRITIS INVOLVING MULTIPLE JOINTS: ICD-10-CM

## 2022-11-15 PROCEDURE — 97110 THERAPEUTIC EXERCISES: CPT

## 2022-11-15 PROCEDURE — 97162 PT EVAL MOD COMPLEX 30 MIN: CPT

## 2022-11-17 ENCOUNTER — OFFICE VISIT (OUTPATIENT)
Dept: PHYSICAL THERAPY | Facility: HOSPITAL | Age: 67
End: 2022-11-17
Attending: INTERNAL MEDICINE
Payer: MEDICARE

## 2022-11-17 DIAGNOSIS — M79.7 FIBROMYALGIA: ICD-10-CM

## 2022-11-17 DIAGNOSIS — M15.9 PRIMARY OSTEOARTHRITIS INVOLVING MULTIPLE JOINTS: ICD-10-CM

## 2022-11-17 PROCEDURE — 97140 MANUAL THERAPY 1/> REGIONS: CPT

## 2022-11-17 PROCEDURE — 97110 THERAPEUTIC EXERCISES: CPT

## 2022-11-21 ENCOUNTER — OFFICE VISIT (OUTPATIENT)
Dept: INTERNAL MEDICINE CLINIC | Facility: CLINIC | Age: 67
End: 2022-11-21
Payer: MEDICARE

## 2022-11-21 VITALS
OXYGEN SATURATION: 99 % | WEIGHT: 177 LBS | BODY MASS INDEX: 29 KG/M2 | TEMPERATURE: 97 F | SYSTOLIC BLOOD PRESSURE: 116 MMHG | HEART RATE: 86 BPM | DIASTOLIC BLOOD PRESSURE: 60 MMHG

## 2022-11-21 DIAGNOSIS — Z13.89 NEPHROPATHY SCREEN: ICD-10-CM

## 2022-11-21 DIAGNOSIS — M54.9 CHRONIC BILATERAL BACK PAIN, UNSPECIFIED BACK LOCATION: ICD-10-CM

## 2022-11-21 DIAGNOSIS — G89.29 CHRONIC BILATERAL BACK PAIN, UNSPECIFIED BACK LOCATION: ICD-10-CM

## 2022-11-21 DIAGNOSIS — I10 ESSENTIAL HYPERTENSION: Primary | ICD-10-CM

## 2022-11-21 LAB
CREAT UR-SCNC: 91.3 MG/DL
MICROALBUMIN UR-MCNC: 0.71 MG/DL
MICROALBUMIN/CREAT 24H UR-RTO: 7.8 UG/MG (ref ?–30)

## 2022-11-21 PROCEDURE — 82570 ASSAY OF URINE CREATININE: CPT | Performed by: INTERNAL MEDICINE

## 2022-11-21 PROCEDURE — 90662 IIV NO PRSV INCREASED AG IM: CPT | Performed by: INTERNAL MEDICINE

## 2022-11-21 PROCEDURE — 99214 OFFICE O/P EST MOD 30 MIN: CPT | Performed by: INTERNAL MEDICINE

## 2022-11-21 PROCEDURE — 82043 UR ALBUMIN QUANTITATIVE: CPT | Performed by: INTERNAL MEDICINE

## 2022-11-21 RX ORDER — METOPROLOL SUCCINATE 50 MG/1
50 TABLET, EXTENDED RELEASE ORAL DAILY
Qty: 90 TABLET | Refills: 1 | Status: SHIPPED | OUTPATIENT
Start: 2022-11-21

## 2022-11-21 RX ORDER — ATORVASTATIN CALCIUM 10 MG/1
10 TABLET, FILM COATED ORAL NIGHTLY
Qty: 90 TABLET | Refills: 1 | Status: SHIPPED | OUTPATIENT
Start: 2022-11-21

## 2022-11-22 ENCOUNTER — OFFICE VISIT (OUTPATIENT)
Dept: PHYSICAL THERAPY | Facility: HOSPITAL | Age: 67
End: 2022-11-22
Attending: INTERNAL MEDICINE
Payer: MEDICARE

## 2022-11-22 DIAGNOSIS — M15.9 PRIMARY OSTEOARTHRITIS INVOLVING MULTIPLE JOINTS: ICD-10-CM

## 2022-11-22 DIAGNOSIS — M79.7 FIBROMYALGIA: ICD-10-CM

## 2022-11-22 PROCEDURE — 97110 THERAPEUTIC EXERCISES: CPT

## 2022-11-22 PROCEDURE — 97140 MANUAL THERAPY 1/> REGIONS: CPT

## 2022-11-23 ENCOUNTER — TELEPHONE (OUTPATIENT)
Dept: INTERNAL MEDICINE CLINIC | Facility: CLINIC | Age: 67
End: 2022-11-23

## 2022-11-23 NOTE — TELEPHONE ENCOUNTER
Received call from [address] [place] [address] asking which PT location specifically the Pt was referred to for physical therapy such that the NPI# can be added to the referral.  Advised PSR to tell Rep to call the patient and ask--that it is up to the patient to select one of the six in-network location options on referral given transportation/ scheduling decisions they make.

## 2022-11-29 ENCOUNTER — OFFICE VISIT (OUTPATIENT)
Dept: PHYSICAL THERAPY | Facility: HOSPITAL | Age: 67
End: 2022-11-29
Attending: INTERNAL MEDICINE
Payer: MEDICARE

## 2022-11-29 DIAGNOSIS — M15.9 PRIMARY OSTEOARTHRITIS INVOLVING MULTIPLE JOINTS: ICD-10-CM

## 2022-11-29 DIAGNOSIS — M79.7 FIBROMYALGIA: ICD-10-CM

## 2022-11-29 PROCEDURE — 97140 MANUAL THERAPY 1/> REGIONS: CPT

## 2022-11-29 PROCEDURE — 97110 THERAPEUTIC EXERCISES: CPT

## 2022-12-01 ENCOUNTER — OFFICE VISIT (OUTPATIENT)
Dept: PHYSICAL THERAPY | Facility: HOSPITAL | Age: 67
End: 2022-12-01
Attending: INTERNAL MEDICINE
Payer: MEDICARE

## 2022-12-01 DIAGNOSIS — M15.9 PRIMARY OSTEOARTHRITIS INVOLVING MULTIPLE JOINTS: ICD-10-CM

## 2022-12-01 DIAGNOSIS — M79.7 FIBROMYALGIA: ICD-10-CM

## 2022-12-01 PROCEDURE — 97140 MANUAL THERAPY 1/> REGIONS: CPT

## 2022-12-01 PROCEDURE — 97110 THERAPEUTIC EXERCISES: CPT

## 2022-12-06 ENCOUNTER — TELEPHONE (OUTPATIENT)
Dept: INTERNAL MEDICINE CLINIC | Facility: CLINIC | Age: 67
End: 2022-12-06

## 2022-12-06 ENCOUNTER — OFFICE VISIT (OUTPATIENT)
Dept: PHYSICAL THERAPY | Facility: HOSPITAL | Age: 67
End: 2022-12-06
Attending: INTERNAL MEDICINE
Payer: MEDICARE

## 2022-12-06 DIAGNOSIS — M79.7 FIBROMYALGIA: ICD-10-CM

## 2022-12-06 DIAGNOSIS — M15.9 PRIMARY OSTEOARTHRITIS INVOLVING MULTIPLE JOINTS: ICD-10-CM

## 2022-12-06 PROCEDURE — 97140 MANUAL THERAPY 1/> REGIONS: CPT

## 2022-12-06 PROCEDURE — 97110 THERAPEUTIC EXERCISES: CPT

## 2022-12-06 NOTE — TELEPHONE ENCOUNTER
Spoke with daughter Chitra Cottrell in regards to lab results.  was verified and results were given.

## 2022-12-06 NOTE — TELEPHONE ENCOUNTER
----- Message from Nita Amador MD sent at 12/6/2022  8:51 AM CST -----  Results normal please inform.

## 2022-12-12 RX ORDER — ALPRAZOLAM 0.25 MG/1
TABLET ORAL
Qty: 30 TABLET | Refills: 0 | Status: SHIPPED | OUTPATIENT
Start: 2022-12-12

## 2023-01-09 ENCOUNTER — TELEPHONE (OUTPATIENT)
Dept: INTERNAL MEDICINE CLINIC | Facility: CLINIC | Age: 68
End: 2023-01-09

## 2023-01-09 NOTE — TELEPHONE ENCOUNTER
Called patient and left message for her to call back. Patient has an additional refill for Metformin and Atorvastatin in regards to the Hydrochlorothiazide she reported she was not taking this on 10/20/2022 . Cyclobenzaprine and Naproxen were given on 10/20/2022 and per instructions she was to take them as needed she was given a quantity of 90 tablets on 10/20/2022, is she taking taking the muscle relaxer daily?

## 2023-01-09 NOTE — TELEPHONE ENCOUNTER
Pt is calling requesting refill on metformin,naproxen,cyclobenzaprine and hydrochlorothiazide.   Pt states she is leaving to [facility].      Please call and advise

## 2023-01-10 ENCOUNTER — TELEPHONE (OUTPATIENT)
Dept: INTERNAL MEDICINE CLINIC | Facility: CLINIC | Age: 68
End: 2023-01-10

## 2023-01-10 RX ORDER — NAPROXEN 500 MG/1
500 TABLET ORAL 2 TIMES DAILY PRN
Qty: 60 TABLET | Refills: 0 | Status: SHIPPED | OUTPATIENT
Start: 2023-01-10

## 2023-01-10 RX ORDER — ALPRAZOLAM 0.25 MG/1
0.25 TABLET ORAL
Qty: 30 TABLET | Refills: 0 | Status: SHIPPED | OUTPATIENT
Start: 2023-01-10

## 2023-01-10 RX ORDER — ALPRAZOLAM 0.25 MG/1
0.25 TABLET ORAL
Qty: 30 TABLET | Refills: 0 | Status: SHIPPED | OUTPATIENT
Start: 2023-01-10 | End: 2023-01-10

## 2023-01-10 NOTE — TELEPHONE ENCOUNTER
Patient is in need of xanax and naproxen only. Encounter from 1/10/23 will be kept, duplicate encounters.

## 2023-01-11 RX ORDER — HYDROCHLOROTHIAZIDE 12.5 MG/1
TABLET ORAL
Qty: 90 TABLET | Refills: 0 | Status: SHIPPED | OUTPATIENT
Start: 2023-01-11

## 2023-02-15 NOTE — TELEPHONE ENCOUNTER
Patient notified of her results, she is looking for controlled medication and was notified that Dr Dedra Paige is out of the office and that more likely she was going to hear from us next week.        ----- Message from Valentino Singh MD sent at 10/31/2022  8:58 AM CDT -----  Please inform that xray cervical neck reviewed. No acute findings. Stable neck fusion. Continue to take medications as prescribed. Physical therapy as recommended by rheumatologist as well.       PQ
Refill sent in and patient informed.
no

## 2023-03-01 ENCOUNTER — TELEPHONE (OUTPATIENT)
Dept: INTERNAL MEDICINE CLINIC | Facility: CLINIC | Age: 68
End: 2023-03-01

## 2023-03-01 RX ORDER — ALPRAZOLAM 0.25 MG/1
0.25 TABLET ORAL
Qty: 30 TABLET | Refills: 0 | Status: SHIPPED | OUTPATIENT
Start: 2023-03-01

## 2023-03-02 RX ORDER — DULOXETIN HYDROCHLORIDE 60 MG/1
CAPSULE, DELAYED RELEASE ORAL
Qty: 90 CAPSULE | Refills: 0 | Status: SHIPPED | OUTPATIENT
Start: 2023-03-02

## 2023-03-17 ENCOUNTER — TELEPHONE (OUTPATIENT)
Dept: INTERNAL MEDICINE CLINIC | Facility: CLINIC | Age: 68
End: 2023-03-17

## 2023-04-05 ENCOUNTER — OFFICE VISIT (OUTPATIENT)
Dept: INTERNAL MEDICINE CLINIC | Facility: CLINIC | Age: 68
End: 2023-04-05
Payer: MEDICARE

## 2023-04-05 ENCOUNTER — LAB ENCOUNTER (OUTPATIENT)
Dept: LAB | Facility: HOSPITAL | Age: 68
End: 2023-04-05
Attending: INTERNAL MEDICINE
Payer: MEDICARE

## 2023-04-05 ENCOUNTER — HOSPITAL ENCOUNTER (OUTPATIENT)
Dept: GENERAL RADIOLOGY | Facility: HOSPITAL | Age: 68
Discharge: HOME | End: 2023-04-05
Attending: INTERNAL MEDICINE
Payer: MEDICARE

## 2023-04-05 VITALS
HEART RATE: 65 BPM | DIASTOLIC BLOOD PRESSURE: 60 MMHG | TEMPERATURE: 98 F | OXYGEN SATURATION: 98 % | SYSTOLIC BLOOD PRESSURE: 126 MMHG | BODY MASS INDEX: 28.32 KG/M2 | HEIGHT: 65 IN | WEIGHT: 170 LBS

## 2023-04-05 DIAGNOSIS — E11.9 CONTROLLED TYPE 2 DIABETES MELLITUS WITHOUT COMPLICATION, WITHOUT LONG-TERM CURRENT USE OF INSULIN (HCC): ICD-10-CM

## 2023-04-05 DIAGNOSIS — G89.29 CHRONIC PAIN OF BOTH KNEES: ICD-10-CM

## 2023-04-05 DIAGNOSIS — L82.1 SEBORRHEIC KERATOSES: ICD-10-CM

## 2023-04-05 DIAGNOSIS — F41.9 ANXIETY: ICD-10-CM

## 2023-04-05 DIAGNOSIS — Z78.0 POST-MENOPAUSAL: ICD-10-CM

## 2023-04-05 DIAGNOSIS — Z12.31 VISIT FOR SCREENING MAMMOGRAM: ICD-10-CM

## 2023-04-05 DIAGNOSIS — M25.562 CHRONIC PAIN OF BOTH KNEES: ICD-10-CM

## 2023-04-05 DIAGNOSIS — Z00.00 MEDICARE ANNUAL WELLNESS VISIT, SUBSEQUENT: Primary | ICD-10-CM

## 2023-04-05 DIAGNOSIS — F33.0 MILD RECURRENT MAJOR DEPRESSION (HCC): Chronic | ICD-10-CM

## 2023-04-05 DIAGNOSIS — Z00.00 ENCOUNTER FOR ANNUAL HEALTH EXAMINATION: ICD-10-CM

## 2023-04-05 DIAGNOSIS — M25.561 CHRONIC PAIN OF BOTH KNEES: ICD-10-CM

## 2023-04-05 DIAGNOSIS — I10 ESSENTIAL HYPERTENSION: ICD-10-CM

## 2023-04-05 DIAGNOSIS — Z13.5 DIABETIC RETINOPATHY SCREENING: ICD-10-CM

## 2023-04-05 LAB
ALBUMIN SERPL-MCNC: 4 G/DL (ref 3.4–5)
ALBUMIN/GLOB SERPL: 1.2 {RATIO} (ref 1–2)
ALP LIVER SERPL-CCNC: 96 U/L
ALT SERPL-CCNC: 31 U/L
ANION GAP SERPL CALC-SCNC: 4 MMOL/L (ref 0–18)
AST SERPL-CCNC: 19 U/L (ref 15–37)
BASOPHILS # BLD AUTO: 0.03 X10(3) UL (ref 0–0.2)
BASOPHILS NFR BLD AUTO: 0.6 %
BILIRUB SERPL-MCNC: 0.7 MG/DL (ref 0.1–2)
BUN BLD-MCNC: 12 MG/DL (ref 7–18)
BUN/CREAT SERPL: 18.5 (ref 10–20)
CALCIUM BLD-MCNC: 9.6 MG/DL (ref 8.5–10.1)
CHLORIDE SERPL-SCNC: 109 MMOL/L (ref 98–112)
CHOLEST SERPL-MCNC: 179 MG/DL (ref ?–200)
CO2 SERPL-SCNC: 30 MMOL/L (ref 21–32)
CREAT BLD-MCNC: 0.65 MG/DL
CREAT UR-SCNC: 211 MG/DL
DEPRECATED RDW RBC AUTO: 37.4 FL (ref 35.1–46.3)
EOSINOPHIL # BLD AUTO: 0.16 X10(3) UL (ref 0–0.7)
EOSINOPHIL NFR BLD AUTO: 3 %
ERYTHROCYTE [DISTWIDTH] IN BLOOD BY AUTOMATED COUNT: 12.4 % (ref 11–15)
EST. AVERAGE GLUCOSE BLD GHB EST-MCNC: 151 MG/DL (ref 68–126)
FASTING PATIENT LIPID ANSWER: YES
FASTING STATUS PATIENT QL REPORTED: YES
GFR SERPLBLD BASED ON 1.73 SQ M-ARVRAT: 97 ML/MIN/1.73M2 (ref 60–?)
GLOBULIN PLAS-MCNC: 3.3 G/DL (ref 2.8–4.4)
GLUCOSE BLD-MCNC: 126 MG/DL (ref 70–99)
HBA1C MFR BLD: 6.9 % (ref ?–5.7)
HCT VFR BLD AUTO: 39.1 %
HDLC SERPL-MCNC: 66 MG/DL (ref 40–59)
HGB BLD-MCNC: 12.6 G/DL
IMM GRANULOCYTES # BLD AUTO: 0.02 X10(3) UL (ref 0–1)
IMM GRANULOCYTES NFR BLD: 0.4 %
LDLC SERPL CALC-MCNC: 88 MG/DL (ref ?–100)
LYMPHOCYTES # BLD AUTO: 2.1 X10(3) UL (ref 1–4)
LYMPHOCYTES NFR BLD AUTO: 39.8 %
MCH RBC QN AUTO: 26.9 PG (ref 26–34)
MCHC RBC AUTO-ENTMCNC: 32.2 G/DL (ref 31–37)
MCV RBC AUTO: 83.5 FL
MICROALBUMIN UR-MCNC: 1.47 MG/DL
MICROALBUMIN/CREAT 24H UR-RTO: 7 UG/MG (ref ?–30)
MONOCYTES # BLD AUTO: 0.51 X10(3) UL (ref 0.1–1)
MONOCYTES NFR BLD AUTO: 9.7 %
NEUTROPHILS # BLD AUTO: 2.45 X10 (3) UL (ref 1.5–7.7)
NEUTROPHILS # BLD AUTO: 2.45 X10(3) UL (ref 1.5–7.7)
NEUTROPHILS NFR BLD AUTO: 46.5 %
NONHDLC SERPL-MCNC: 113 MG/DL (ref ?–130)
OSMOLALITY SERPL CALC.SUM OF ELEC: 297 MOSM/KG (ref 275–295)
PLATELET # BLD AUTO: 195 10(3)UL (ref 150–450)
POTASSIUM SERPL-SCNC: 4.5 MMOL/L (ref 3.5–5.1)
PROT SERPL-MCNC: 7.3 G/DL (ref 6.4–8.2)
RBC # BLD AUTO: 4.68 X10(6)UL
SODIUM SERPL-SCNC: 143 MMOL/L (ref 136–145)
TRIGL SERPL-MCNC: 145 MG/DL (ref 30–149)
VLDLC SERPL CALC-MCNC: 23 MG/DL (ref 0–30)
WBC # BLD AUTO: 5.3 X10(3) UL (ref 4–11)

## 2023-04-05 PROCEDURE — 73564 X-RAY EXAM KNEE 4 OR MORE: CPT | Performed by: INTERNAL MEDICINE

## 2023-04-05 PROCEDURE — 36415 COLL VENOUS BLD VENIPUNCTURE: CPT | Performed by: INTERNAL MEDICINE

## 2023-04-05 PROCEDURE — 83036 HEMOGLOBIN GLYCOSYLATED A1C: CPT | Performed by: INTERNAL MEDICINE

## 2023-04-05 PROCEDURE — 85025 COMPLETE CBC W/AUTO DIFF WBC: CPT | Performed by: INTERNAL MEDICINE

## 2023-04-05 PROCEDURE — 82043 UR ALBUMIN QUANTITATIVE: CPT | Performed by: INTERNAL MEDICINE

## 2023-04-05 PROCEDURE — 82570 ASSAY OF URINE CREATININE: CPT | Performed by: INTERNAL MEDICINE

## 2023-04-05 PROCEDURE — 80061 LIPID PANEL: CPT | Performed by: INTERNAL MEDICINE

## 2023-04-05 PROCEDURE — 80053 COMPREHEN METABOLIC PANEL: CPT | Performed by: INTERNAL MEDICINE

## 2023-04-05 RX ORDER — HYDROCHLOROTHIAZIDE 12.5 MG/1
12.5 TABLET ORAL DAILY
Qty: 90 TABLET | Refills: 1 | Status: SHIPPED | OUTPATIENT
Start: 2023-04-05

## 2023-04-05 RX ORDER — TRIAMCINOLONE ACETONIDE 1 MG/G
CREAM TOPICAL 2 TIMES DAILY PRN
Qty: 60 G | Refills: 3 | Status: SHIPPED | OUTPATIENT
Start: 2023-04-05

## 2023-04-05 RX ORDER — METOPROLOL SUCCINATE 50 MG/1
50 TABLET, EXTENDED RELEASE ORAL DAILY
Qty: 90 TABLET | Refills: 1 | Status: SHIPPED | OUTPATIENT
Start: 2023-04-05

## 2023-04-05 RX ORDER — ATORVASTATIN CALCIUM 10 MG/1
10 TABLET, FILM COATED ORAL NIGHTLY
Qty: 90 TABLET | Refills: 1 | Status: SHIPPED | OUTPATIENT
Start: 2023-04-05

## 2023-04-05 RX ORDER — MELOXICAM 15 MG/1
15 TABLET ORAL DAILY PRN
Qty: 30 TABLET | Refills: 1 | Status: SHIPPED | OUTPATIENT
Start: 2023-04-05

## 2023-04-07 ENCOUNTER — TELEPHONE (OUTPATIENT)
Dept: INTERNAL MEDICINE CLINIC | Facility: CLINIC | Age: 68
End: 2023-04-07

## 2023-04-07 RX ORDER — ALPRAZOLAM 0.25 MG/1
0.25 TABLET ORAL
Qty: 30 TABLET | Refills: 0 | Status: SHIPPED | OUTPATIENT
Start: 2023-04-07

## 2023-04-07 NOTE — TELEPHONE ENCOUNTER
Patient was contacted and results were discussed, recommendations made and she will be contacting Dr Debo Quintana for appt, she was reminded to schedule rest of the appts as well.        ----- Message from Jan Frye MD sent at 4/7/2023  1:27 AM CDT -----  Please inform her that her labs have been reviewed. Hemoglobin A1c well controlled at 6.9. Continue current medication. Lipid panel well controlled. Kidney and liver function normal.    Nephropathy screen normal.    Complete blood count normal.    Bilateral knee x-rays reveal osteoarthritis. Follow-up with physiatry referral was provided during visit.     Remind her to schedule her ophthalmology appointment as well as her mammogram and DEXA scan    See me in 3 months

## 2023-04-17 NOTE — TELEPHONE ENCOUNTER
Patient has been scheduled for Bilateral C3-4, C4-5 facet joint injection under IVCS on 05/19/23 at the [facility] with Dr.Behar.   -Anesthesia type: IVCS. -If scheduling [facility] covid testing required for all procedures whether patient is vaccinated or not. -Patient informed not to eat or drink anything after midnight the night prior to the procedure, if being sedated. (Patient informed to fast 12 hours prior to procedure with IVCS/MAC. )  -Patient was advised that if he/she does receive the covid vaccine it needs to be at least 2 weeks before or after the injection. NA  -Medications and allergies reviewed. -Patient reminded to hold NSAIDs (Ibuprofen, ASA 81, Aleve, Naproxen, Mobic, Diclofenac, Etodolac, Celebrex etc.) for 3 days prior to [facility]  if BMI is greater than 35. For Cervical injections only hold multivitamins, Vitamin E, Fish Oil, Phentermine (Lomaira) for 7 days prior to injection and NSAIDS.  mg to be held for 7 days prior to injections.  -If patient is receiving MAC/IVCS Phentermine Rhett Chuy) will need to be held for 7 days prior to injection. NA  -If on blood thinner clearance has been received to hold this medication by provider. NA  -Patient informed he/she will need a  to and from procedure.    Patient verbalized understanding and agrees with plan.  -----> Scheduled in Epic: Yes  -----> Scheduled in [employer]: N/A

## 2023-05-18 NOTE — TELEPHONE ENCOUNTER
----- Message from David Vera MD sent at 5/18/2023 10:14 AM CDT -----  Please inform of benign mammogram.   Screening annually recommended. Results were discussed with patient, she also needs a referral to be fax to Dr Ileana Rodríguez.

## 2023-05-19 NOTE — DISCHARGE INSTRUCTIONS
[address] ENDOSCOPY LAB SUITES  [place] [id number]  Dept: [phone]  Loc: [phone]    No name on file       Post Injection/Procedure Instructions    PAIN:  Your usual pain should gradually decrease in 2-3 days, but relief may sometimes take up to two weeks after your procedure. A temporary flare-up of pain for 1-2 days after a procedure is also normal.  Please take your usual pain medicines if this happens. ACTIVITY LEVELS:  Day of Procedure: Take it easy. We recommend no new activities or heavy work. Avoid sitting or standing for long periods of time and change your position as needed. Day 2:  You may return to normal activities within reason. If your physician gives you specific instructions, please follow these. You may return to physical therapy. DIET:  Return to your normal diet as tolerated. MEDICATIONS:  Aspirin and Blood-thinners: Follow specific instructions your doctor gave you. Otherwise, refrain from taking aspirin and other blood-thinning medications for 6 hours after your injection. Then resume your next scheduled dose. Resume your other medications the day of the injection. BANDAGE:  Remove after 24 hours. SHOWERING:  You may shower the following day. No bathing, swimming, or hot tub for 2 days after the procedure to reduce the risk of infection. COLD PACKS:  You may use ice compresses over the injection site - 20 minutes on, then 40 minutes off as needed. To avoid skin injury, place a thin cloth between cold pack and the skin. Do not apply cold packs to numb areas following injection. [address] immediately if you experience any of the following:  Fever (over 100 degrees F), chills, drainage, excessive swelling or redness from the injection site, problems with bowel or bladder control, or new weakness or new severe pain.   If you cannot reach your physician, please go to the nearest emergency room. COMMON SIDE EFFECTS:  Numbness for 4 to 8 hours due to the local anesthetic. Minor pain at the injection site. A small amount of bleeding at the injection site. If a steroid medication was used during the procedure, possible side effects include redness of the face, headache, trouble sleeping, indigestion, increased appetite and/or a low grade fever (less that 100 degrees F). All side effects should disappear within 1 to 3 days after the procedure. POST-PROCEDURAL HEADACHE:  Mild headaches may be a normal reaction after a steroid injection. Lie down as much as possible for the first 24 hours. You can take Tylenol up to 3 grams per day in doses of 1 gram every 8 hours. Drink plenty of caffeinated beverages. If you continue to have headaches after 24 hours following the procedure, or experience severe headaches, please contact our office.

## 2023-05-19 NOTE — OPERATIVE REPORT
CERVICAL Z-JOINT/FACET INJECTION  NAME:  Chary Garber    MR #:    [id number] :  9/15/1955     PHYSICIAN:  Magda Hernández MD        Operative Report    DATE OF PROCEDURE: 2023   PREOPERATIVE DIAGNOSES:  Cervical spondylosis, cervical facet arthropathy   POSTOPERATIVE DIAGNOSES:   Cervical spondylosis, cervical facet arthropathy    PROCEDURES: Right C3-4 and C4-5 Z-Joint Injection done under fluoroscopic guidance with contrast enhancement. SURGEON: Magda Hernández MD   ANESTHESIA: Local   INDICATIONS:      OPERATIVE PROCEDURE:  Written consent was obtained from the patient. The patient was brought into the operating room and placed in the prone position on the fluoroscopy table with pillow underneath the chest and shoulders. The patient's skin was cleaned and draped in a normal sterile fashion. Using AP fluoroscopy, the Right C3-4 and C4-5 z-joints were identified. Skin was anesthetized with 1% PF lidocaine without epinephrine overlying each joint. Then, a 3-1/2 inch, 22-gauge spinal needle was inserted and directed towards the Right C3-4 and C4-5 z-joint(s). When they were engaged, Omnipaque-240 contrast was used to obtain a good arthrogram indicating correct needle placement. Then, aspiration was performed. No blood, fluid, or air was aspirated. Then, each joint was injected with a 1 cc solution of 0.5 cc of 1% lidocaine without epinephrine and 0.5 cc of 1 mg/cc of 40 mg of Kenalog/cc. Then, the needles were removed. The patient's skin was cleaned. Band-Aids were applied. The patient was transferred to the cart and into [facility]. The patient was given discharge instructions and will follow up in the clinic as scheduled. Throughout the whole procedure, the patient's pulse oximetry and vital signs were monitored and they remained completely stable. Also, throughout the whole procedure, prior to injection of any medication, aspiration was performed.   No blood, fluid, or air was aspirated at anytime. Rene Justice.  Paramjit Oconnell MD, [facility]  Physical Medicine and Rehabilitation/Sports Medicine  MEDICAL CENTER [facility]

## 2023-05-19 NOTE — DISCHARGE SUMMARY
Outpatient Surgery Brief Discharge Summary         Patient ID:  Lianet Quinn  [id number]  78year old  9/15/1955    Discharge Diagnoses:Cervical spondylosis, cervical facet arthropathy    Procedures: Right C3-C4 and C4-C5 facet joint injections under local anesthesia    Discharged Condition: stable    Disposition: home    Patient Instructions: Follow-up with Rebeca Guzman MD in 1-2 weeks. Diet: regular diet  Activity: As tolerated    Julio Marin MD, [address] [place] [address]  Physical Medicine and Rehabilitation/Sports Medicine  MEDICAL [facility]

## 2023-05-25 NOTE — PATIENT INSTRUCTIONS
1) Please call and schedule your MRI at [phone]. Once you have your MRI scheduled, then call my office again to schedule a follow-up visit soon after your MRI so we may review the images together. 2) Continue Naprosyn, gabapentin, and Duloxetine  3) Tylenol 500-1000 mg every 6-8 hours as needed for pain. No more than 3000 mg daily. 4) Lets touch abse to review the MRI images and discuss next steps which may include epidural steroid injection.    5) Start physical therapy for the neck and shoulders  6() If shoulder pain persists, then would recommend bilateral subacromial CSI

## 2023-06-16 NOTE — TELEPHONE ENCOUNTER
Pt is calling stating that tomorrow is leaving to [facility] and will like to get a refill on alprazolam 0.25 mg for 2 months.       Please call and advise

## 2023-08-28 NOTE — TELEPHONE ENCOUNTER
Refill Request    Medication request: naproxen (NAPROSYN) 500 MG Oral Tab   Take 1 tablet (500 mg total) by mouth 2 (two) times daily with meals. Take for 2 weeks as directed and then as needed.      Yuliana Lanier MD   Due back to clinic per last office note:  after MRI  NOV: Visit date not found      [place]: 05/25/23  #60    Urine drug screen (if applicable): n/a  Pain contract: n/a    LOV plan (if weaning or changing medications): 2) Continue Naprosyn, gabapentin, and Duloxetine

## 2023-10-17 NOTE — TELEPHONE ENCOUNTER
LMTCB    ----- Message from Maria Guadalupe Mcclain MD sent at 10/17/2023 11:35 AM CDT -----  Please inform that urine test is normal.      SHE IS ALSO DUE FOR EYE EXAM

## 2023-11-28 NOTE — TELEPHONE ENCOUNTER
Routed to , can you please update insurance. Once updated, I can reach out to [facility].

## 2023-11-28 NOTE — TELEPHONE ENCOUNTER
Daughter of pt is calling stating received a call from Dr. Cassius Winters office stating that the referral that was sent to their office is not the correct one the need it to be sent to [facility] and be approved. Daughter states that pt has the appointment on 11/30 and to please let her know when referral  is ready since its the second time appointment is going to be change.       Please call and advise

## 2023-11-30 NOTE — TELEPHONE ENCOUNTER
Daughter called back requesting status of referral   Pt will be r/s appt but daughter wants to be informed when referral is authorized

## 2024-01-02 NOTE — TELEPHONE ENCOUNTER
Pt is calling requesting refill on medications metformin 500 mg, atorvastatin 10 mg,amlodipine 5 mg and lisinopril 2.5 mg.      Please call and advise

## 2024-01-09 NOTE — TELEPHONE ENCOUNTER
Returned call to patient;s daughter to exp;ain that the PCP does not submit a referral for an MRI that a specialost prders ( e.g. Dr Behar). Rather we refer her to the specialist and then the specialist orders what they deem mendically necessary .  In the case of imaging orders/ MRIs-- aspecial team [facility] them contacts insuramce to obtain auth's for the MRIs Dr Behar orders.      All this appears to have been attempted in May 2023 when Dr Behar actually ordered the MRI and there was some issue w/ [facility] system at the time. No auth appears to have been secured by [facility] team for the MRI.    Routed messages to both the [facility] team and central referral team to assist on sorting this out- obtaining the MRI auth. Dtr verbalizes understanding and asks for PH# to the MRI dept to ck status of auth prior to bringing her mom for the  MRI appt-- PH# provided.

## 2024-01-09 NOTE — TELEPHONE ENCOUNTER
Patients daughter calling to get referral for and MRI Dr. Behar is sending her to get. She has an appointment for the MRI scheduled for this Thursday.

## 2024-01-10 NOTE — TELEPHONE ENCOUNTER
Please advise if this is appropriate?    MEDICATION REFILL REQUEST:    Future Appointment: 04/10/2024    Last appointment: 10/05/2023    Medication requested:   Requested Prescriptions     Pending Prescriptions Disp Refills    NAPROXEN 500 MG Oral Tab [Pharmacy Med Name: Naproxen 500 Mg Tab Auro] 60 tablet 0     Sig: TAKE 1 TABLET BY MOUTH TWICE DAILY WITH MEALS. TAKE FOR 2 WEEKS AS DIRECTED AND THEN AS NEEDED         Last refill date:   naproxen 500 MG Oral Tab 60 tablet 1 11/13/2023 --    Sig: TAKE 1 TABLET BY MOUTH TWICE DAILY WITH MEALS. TAKE FOR 2 WEEKS AS DIRECTED AND THEN AS NEEDED    Sent to pharmacy as: Naproxen 500 MG Oral Tablet (Naprosyn)    E-Prescribing Status: Receipt confirmed by pharmacy (11/13/2023  5:46 PM CST)

## 2024-01-10 NOTE — TELEPHONE ENCOUNTER
Pt's daughter called asking if Pt's MRI was approved as she has the appointment tomorrow. Informed that per the notes of referrals it is approved. Also gave authorization #[id number].

## 2024-01-18 NOTE — TELEPHONE ENCOUNTER
Patient needs a referral for Dr. Alex Behar so he can review mri results with her. She says she has an appt with him this Tuesday at 7am in the [place] location.

## 2024-01-23 NOTE — PATIENT INSTRUCTIONS
1) Continue gabapentin 400 gm three times per day, Duloxetine 60 mg daily, Naproxen twice per day as needed  2) Start cyclobenzaprine 5 mg 0.5-2 tablets three times per day as needed for spasms. Do not operate heavy machinery while on this medication as it may make you sleepy.   3) Tylenol 500-1000 mg every 6-8 hours as needed for pain.  No more than 3000 mg daily.  4) Start tumeric with black pepper 1000 mg twice per day  5) Star a gluten free diet to decrease inflammation  6) Please begin physical therapy as soon as possible. This can be done at [facility] in [place]  7) Follow up with me in about 2 months. We can consider trigger point injections in the future.

## 2024-01-23 NOTE — PROGRESS NOTES
[facility]  Progress Note    CHIEF COMPLAINT:    Chief Complaint   Patient presents with    Follow - Up     MRI cervical spine. She is currently taking gabapentin and is not doing PT because she states it does not help. Her pain is still verybad ratees it a 9/10         History of Present Illness:  The patient is a 68 year old RIGHT handed female with significant past medical history of C5-C6 fusion by Dr. Alcala about 15 years ago who presents with neck pain which she rates as high as a 10/10 but currently a 7 out of 10 which she described as burning and sharp and radiates to the bilateral parascapular region.  She was last seen by me on 2023 where I recommended an MRI of the cervical spine.  She had this performed and I reviewed it with her today.  She rates her pain a 9 out of 10 in the neck and upper back region.  She has been taking gabapentin.  She has not been doing physical therapy or home exercises.  She is also taking duloxetine 60 mg daily.    PAST MEDICAL HISTORY:  Past Medical History:   Diagnosis Date    Arthritis     Breast cyst     Colon adenoma 05/25/2022    x1    Colon polyp     Esophageal reflux     Fibromyalgia     High blood pressure     High cholesterol     Ovarian cyst     Prediabetes        SURGICAL HISTORY:  Past Surgical History:   Procedure Laterality Date    CHOLECYSTECTOMY      per NG: Cholecystitis    COLONOSCOPY      COLONOSCOPY  2022    D & C      per NG: induced     HYSTERECTOMY      per NG: For fibroids/pelvic pain    SUSANNE LOCALIZATION WIRE 1 SITE RIGHT (CPT=19281)      x 2           per N week 7 lb(s) 15 oz Female          per NG:; 40 week 7 lb(s) Female          per N week 7 lb(s) 15 oz Male          per N week 7 lb(s) 15 oz Female          per N week 8 lb(s) Male       SOCIAL HISTORY:   Social History     Occupational History    Not on  file   Tobacco Use    Smoking status: Never    Smokeless tobacco: Never   Substance and Sexual Activity    Alcohol use: No    Drug use: No    Sexual activity: Not on file       FAMILY HISTORY:   History reviewed. No pertinent family history.    CURRENT MEDICATIONS:   Current Outpatient Medications   Medication Sig Dispense Refill    cyclobenzaprine 5 MG Oral Tab Start cyclobenzaprine 5 mg 1-2 tablets three times per day as needed for spasms. Do not operate heavy machinery while on this medication as it may make you sleepy. 120 tablet 0    DULoxetine 60 MG Oral Cap DR Particles Take 1 capsule (60 mg total) by mouth daily. 90 capsule 0    ALPRAZolam 0.25 MG Oral Tab Take 1 tablet (0.25 mg total) by mouth daily as needed for Anxiety. 30 tablet 0    naproxen 500 MG Oral Tab TAKE 1 TABLET BY MOUTH TWICE DAILY WITH MEALS. TAKE FOR 2 WEEKS AS DIRECTED AND THEN AS NEEDED 60 tablet 0    Accu-Chek FastClix Lancets Does not apply Misc 1 Lancet by Finger stick route in the morning and 1 Lancet before bedtime. One lancet daily. 100 each 3    Glucose Blood (ACCU-CHEK GUIDE) In Vitro Strip Check sugars once daily 100 strip 3    metoprolol succinate ER 50 MG Oral Tablet 24 Hr Take 1 tablet (50 mg total) by mouth daily. 90 tablet 1    hydroCHLOROthiazide 12.5 MG Oral Tab Take 1 tablet (12.5 mg total) by mouth daily. (Patient not taking: Reported on 10/5/2023)      gabapentin 400 MG Oral Cap Take one three times daily. 270 capsule 3    metFORMIN 500 MG Oral Tab Take 1 tablet (500 mg total) by mouth 2 (two) times daily. 60 tablet 0    atorvastatin 10 MG Oral Tab Take 1 tablet (10 mg total) by mouth nightly. 90 tablet 1    Omeprazole 40 MG Oral Capsule Delayed Release Take 1 capsule (40 mg total) by mouth daily. 90 capsule 0    triamcinolone 0.1 % External Cream Apply topically 2 (two) times daily as needed. 60 g 3       ALLERGIES:   No Known Allergies    REVIEW OF SYSTEMS:   Review of Systems   Constitutional: Negative.    HENT:  Negative.    Eyes: Negative.    Respiratory: Negative.    Cardiovascular: Negative.    Gastrointestinal: Negative.    Genitourinary: Negative.    Musculoskeletal: As per HPI  Skin: Negative.    Neurological: As per HPI  Endo/Heme/Allergies: Negative.    Psychiatric/Behavioral: Negative.      All other systems reviewed and are negative. Pertinent positives and negatives noted in the HPI.        PHYSICAL EXAM:   There were no vitals taken for this visit.    There is no height or weight on file to calculate BMI.      General: No immediate distress  Head: Normocephalic/ Atraumatic  Eyes: Extra-occular movements intact.   Ears: No auricular hematoma or deformities  Mouth: No lesions or ulcerations  Heart: peripheral pulses intact. Normal capillary refill.   Lungs: Non-labored respirations  Abdomen: No abdominal guarding  Extremities: No lower extremity edema bilaterally   Skin: No lesions noted.   Cognition: alert & oriented x 3, attentive, able to follow 2 step commands, comprehention intact, spontaneous speech intact  Motor:    Musculoskeletal:    CERVICAL SPINE:  Inspection: no erythema, swelling, or obvious deformity  Palpation: Tenderness to palpation midline cervical spine as well as bilateral cervical facets from C3-C6, bilateral cervical paraspinals, trapezius, and levator scapula.  ROM: Limitations in all planes of the cervical spine range of motion due to pain  Strength: 5/5 in all myotomes of the BILATERAL upper extremities except 4 out of 5 with bilateral shoulder abduction (C4) although this may be due to pain as well, left elbow flexion (C5)  Sensation: Intact to light touch in all dermatomes of the BILATERAL upper extremities   Reflexes: 1/4 at C5, C6, C7 with a negative Dickens's sign  Spurling Test: negative for radicular symptoms down either extremity bilaterally     SHOULDER:  Inspection: no erythema, swelling, or obvious deformity.  No AC joint deformity  Palpation: no ttp over clavical, AC joint, or  scapular spine.  Tender to palpation over the bilateral greater tuberosity  ROM: Full active range of motion of the bilateral shoulders with pain during abduction and internal rotation bilaterally  Neer's test: Positive bilaterally  Hawkin's test: Positive bilaterally  Cross Arm Test: negative for AC joint pain  Speed's Test: Positive bilaterally for pain and weakness  Yergason Test: negative for biceps tendon pain  Empty Can Test: Positive bilaterally for pain and weakness  Push Off Test: Positive bilaterally for pain and weakness     Gait:  Normal    Data  Office Visit on 10/05/2023   Component Date Value Ref Range Status    HEMOGLOBIN A1C 10/05/2023 6.7 (A)  4.3 - 5.6 % Final    Cartridge Lot# 10/05/2023 [id number]  Numeric Final    Cartridge Expiration Date 10/05/2023 07/30/2025  Date Final    Microalbumin, Urine 10/06/2023 0.74  mg/dL Final    Creatinine Ur Random 10/06/2023 148.00  mg/dL Final    Malb/Cre Calc 10/06/2023 5.0  <=30.0 ug/mg Final   ]      Radiology Imaging:  I reviewed with the patient her MRI of the cervical spine from 1/11/2024  MRI SPINE CERVICAL (CPT=72141)  Narrative: PROCEDURE: MRI SPINE CERVICAL (CPT=72141)     COMPARISON: X CERV SPINE AP LAT OBLS, 2/10/2015, 4:51 PM.  [facility], MRI CERVICAL SPINE WWO CONTRST, 9/20/2012, 8:34 AM.     INDICATIONS: M43.22 Cervical vertebral fusion M48.02 Foraminal stenosis of cervical region M54.12 Cervical radiculopathy M25.512 Chronic pain of both shoulders G89.29 Chron*     TECHNIQUE: A variety of imaging planes and parameters were utilized for visualization of suspected pathology.       FINDINGS:   ALIGNMENT: The anatomic cervical lordosis is preserved.  Moderate dextroscoliosis.  BONES: Postoperative changes of C5-C6 anterior cervical discectomy and fusion.  Resultant susceptibility artifacts limit evaluation.  No acute cervical spine fracture.  Atlantoaxial articulation degeneration with mild surrounding pannus.  CORD: Normal  caliber, contour, and signal intensity.    CRANIOCERVICAL AREA: Normal foramen magnum without Chiari malformation.    PARASPINAL AREA: Partially imaged upper thoracic spine degenerative changes with left greater than right neural foraminal stenosis at T2-T3.  OTHER: There is no visible swelling of the prevertebral soft tissues.     CERVICAL DISC LEVELS:  C2-C3: No significant disc/facet abnormality, spinal stenosis, or foraminal stenosis.    C3-C4: Mild disc desiccation and degeneration with right greater than left uncovertebral joint hypertrophy/facet arthropathy.  Mild-to-moderate right neural foraminal stenosis with no other significant neural compromise.  C4-C5: Posterior disc-osteophyte complex with superimposed central disc protrusion in addition to minor bilateral uncovertebral joint hypertrophy/facet arthropathy.  Minor spinal canal stenosis with no neural foraminal compromise.  C5-C6: Postoperative level is suboptimally evaluated given adjacent susceptibility artifacts.  Minor right neural foraminal stenosis with no other significant neural compromise.  C6-C7: Posterior disc-osteophyte complex with right greater than left uncovertebral joint hypertrophy/facet arthropathy.  Moderate right and mild left neural foraminal stenosis with no spinal canal compromise.  C7-T1: Mild disc and mild right greater than left uncovertebral/facet joint degeneration.  No high-grade spinal canal or neural foraminal stenosis.              Impression: CONCLUSION:      1. Postoperative changes of C5-C6 anterior cervical discectomy and fusion.  Resultant susceptibility artifacts limit evaluation.     2. C6-C7:  Moderate right and mild left neural foraminal stenosis.  3. C3-C4:  Mild-to-moderate right neural foraminal stenosis.     4. Partially imaged upper thoracic spine degenerative changes with left greater than right neural foraminal stenosis at T2-T3.     5. Mild-to-moderate dextroscoliosis of the cervical spine.         elm-Count includes the [facility]     Dictated by (CST): Glenn Burns MD on 1/11/2024 at 11:39 AM       Finalized by (CST): Glenn Burns MD on 1/11/2024 at 11:47 AM              ASSESSMENT AND PLAN:  The patient is a pleasant 68-year-old female who presents for follow-up of her bilateral neck pain, generalized arm pain, and overall inflammation.  I have reviewed reviewed her most recent BRIDGER which was positive but her serology workup for rheumatological conditions was negative.  I have also reviewed her most recent MRI of the cervical spine.  I am recommending she continue gabapentin 400 mg 3 times per day, duloxetine 60 mg daily, and naproxen twice per day if needed.  She should also start cyclobenzaprine and Tylenol.  She can consider using turmeric and a gluten-free diet to decrease inflammation.  I have given her a new order for physical therapy which I would like for her to do at [facility] [place].  She will follow-up with me in about 2 months and we can consider trigger point injections in the future.       RTC in 2 to 3 months  Discharge Instructions were provided as documented in AVS summary.  The patient was in agreement with the assessment and plan.  All questions were answered.  There were no barriers to learning.         1. Cervical spondylosis without myelopathy    2. Trigger point of neck    3. Tendinopathy of rotator cuff, unspecified laterality    4. DDD (degenerative disc disease), cervical    5. Cervical facet syndrome    6. Chronic pain of both shoulders    7. Cervical radiculopathy    8. Foraminal stenosis of cervical region    9. Cervical vertebral fusion        Alex B. Behar MD  Physical Medicine and Rehabilitation/Sports Medicine  [facility]

## 2024-02-23 NOTE — TELEPHONE ENCOUNTER
Patient was contacted about her eye exam, she stated she went last month.  She also needed refill on her Metformin.    Called made to Dr De Luna to obtain record.

## 2024-03-01 NOTE — TELEPHONE ENCOUNTER
Attempted to contact pt. Unable to reach. Message left on voicemail. Spoke with pat daughter who states that pt was getting better from her cold. Pt's mom  last week so her cold came back and the ears are clogged. Daughter wanted to know what if anything can be given over the counter for pt to calm down. Advised daughter to have pt get seen by PCP to see if medication is needed to calm pt down. Daughter states that she will send pt to  for her cold. She will have pt call and make appointment with PCP about her recent loss of her mother.

## 2024-03-01 NOTE — PROGRESS NOTES
CHIEF COMPLAINT:     Chief Complaint   Patient presents with    Sore Throat     ST, HA, upper back pain, bilat ear pain , runny nose   Sick sx last week, mom passed away this week Monday and then sx returned        Language line used for entire visit -  # [phone] [place]        HPI:   Fernando Dhillon is a 68 year old female who presents for upper respiratory symptoms for  11 days. Patient reports congestion, dry cough, body aches .  Symptoms were improving but worsened again 3 days ago.  Associated symptoms include headache, sore throat, ears hurt, eyes, hurt, fatigue, mild sob, dry mouth, nausea, vomited last night, green mucous for past 2 days.  Denies fever.  Home covid test  - negative and again this morning - negative but test was  and would like testing since she has her mother's  this .   Treating symptoms with advil at 830am.       + hx of COVID - 2020; No COVID exposure;  with similar symptoms.  No recent travel.  No large gatherings.      Other conditions:   BMI: Body mass index is 28.41 kg/m².      Current Outpatient Medications   Medication Sig Dispense Refill    metFORMIN 500 MG Oral Tab Take 1 tablet (500 mg total) by mouth 2 (two) times daily. 180 tablet 0    ALPRAZolam 0.25 MG Oral Tab Take 1 tablet (0.25 mg total) by mouth daily as needed for Anxiety. 30 tablet 0    cyclobenzaprine 5 MG Oral Tab Start cyclobenzaprine 5 mg 1-2 tablets three times per day as needed for spasms. Do not operate heavy machinery while on this medication as it may make you sleepy. 120 tablet 0    DULoxetine 60 MG Oral Cap DR Particles Take 1 capsule (60 mg total) by mouth daily. 90 capsule 0    naproxen 500 MG Oral Tab TAKE 1 TABLET BY MOUTH TWICE DAILY WITH MEALS. TAKE FOR 2 WEEKS AS DIRECTED AND THEN AS NEEDED 60 tablet 0    Accu-Chek FastClix Lancets Does not apply Misc 1 Lancet by Finger stick route in the morning and 1 Lancet before bedtime. One lancet daily. 100 each 3     Glucose Blood (ACCU-CHEK GUIDE) In Vitro Strip Check sugars once daily 100 strip 3    metoprolol succinate ER 50 MG Oral Tablet 24 Hr Take 1 tablet (50 mg total) by mouth daily. 90 tablet 1    gabapentin 400 MG Oral Cap Take one three times daily. 270 capsule 3    atorvastatin 10 MG Oral Tab Take 1 tablet (10 mg total) by mouth nightly. 90 tablet 1    Omeprazole 40 MG Oral Capsule Delayed Release Take 1 capsule (40 mg total) by mouth daily. 90 capsule 0    triamcinolone 0.1 % External Cream Apply topically 2 (two) times daily as needed. 60 g 3    hydroCHLOROthiazide 12.5 MG Oral Tab Take 1 tablet (12.5 mg total) by mouth daily. (Patient not taking: Reported on 10/5/2023)        Past Medical History:   Diagnosis Date    Arthritis     Breast cyst     Colon adenoma 05/25/2022    x1    Colon polyp     Esophageal reflux     Fibromyalgia     High blood pressure     High cholesterol     Ovarian cyst     Prediabetes       Past Surgical History:   Procedure Laterality Date    CHOLECYSTECTOMY      per NG: Cholecystitis    COLONOSCOPY      COLONOSCOPY  2022    D & C      per NG: induced     HYSTERECTOMY      per NG: For fibroids/pelvic pain    SUSANNE LOCALIZATION WIRE 1 SITE RIGHT (CPT=19281)      x 2           per N week 7 lb(s) 15 oz Female          per NG:; 40 week 7 lb(s) Female          per N week 7 lb(s) 15 oz Male          per N week 7 lb(s) 15 oz Female          per N week 8 lb(s) Male         Social History     Socioeconomic History    Marital status:    Tobacco Use    Smoking status: Never    Smokeless tobacco: Never   Substance and Sexual Activity    Alcohol use: No    Drug use: No         REVIEW OF SYSTEMS:   GENERAL: see HPI  SKIN: no rashes or abnormal skin lesions  HEENT: See HPI  LUNGS: denies wheezing, See HPI  CARDIOVASCULAR: denies chest pain or palpitations   GI: denies N/V/C or abdominal pain  NEURO: denies  dizziness or lightheadedness    EXAM:   /72   Pulse 87   Temp 99.2 °F (37.3 °C)   Resp 16   Ht 5' 6\" (1.676 m)   Wt 176 lb (79.8 kg)   SpO2 96%   BMI 28.41 kg/m²     Physical Exam  Constitutional:       General: She is not in acute distress.     Appearance: Normal appearance.   HENT:      Head: Normocephalic and atraumatic.      Right Ear: Tympanic membrane and ear canal normal.      Left Ear: Tympanic membrane and ear canal normal.      Nose: Congestion and rhinorrhea present. Rhinorrhea is clear.      Right Sinus: Maxillary sinus tenderness present. No frontal sinus tenderness.      Left Sinus: Maxillary sinus tenderness present. No frontal sinus tenderness.      Comments: + bilateral ethmoid tenderness.     Mouth/Throat:      Lips: Pink.      Mouth: Mucous membranes are moist.      Pharynx: Oropharynx is clear. Uvula midline.      Comments: +pnd.  Eyes:      Extraocular Movements: Extraocular movements intact.      Conjunctiva/sclera: Conjunctivae normal.   Cardiovascular:      Rate and Rhythm: Normal rate and regular rhythm.      Heart sounds: Normal heart sounds. No murmur heard.  Pulmonary:      Effort: Pulmonary effort is normal.      Breath sounds: No decreased breath sounds, wheezing, rhonchi or rales.   Musculoskeletal:      Cervical back: Normal range of motion and neck supple.   Lymphadenopathy:      Cervical: No cervical adenopathy.   Skin:     General: Skin is warm and dry.      Findings: No rash.   Neurological:      General: No focal deficit present.      Mental Status: She is alert.          Recent Results (from the past 24 hour(s))   Rapid Strep    Collection Time: 03/01/24  1:31 PM   Result Value Ref Range    Strep Grp A Screen Negative Negative    Control Line Present with a clear background (yes/no) Yes Yes/No    Kit Lot # [id number] Numeric    Kit Expiration Date 4/22/25 Date   Rapid Covid-19    Collection Time: 03/01/24  1:40 PM    Specimen: Nares   Result Value Ref Range    Rapid  SARS-CoV-2 by PCR Not Detected Not Detected    POCT Lot Number [id number]     POCT Expiration Date 8/28/25     POCT Procedure Control Control Valid Control Valid     ,083        ASSESSMENT AND PLAN:   Fernando Dhillon is a 68 year old female who presents with     ASSESSMENT:   Encounter Diagnoses   Name Primary?    Acute non-recurrent maxillary sinusitis Yes    Sore throat        PLAN:   Reviewed symptom relief measures with patient.   Monitor for worsening symptoms.  Comfort care as described in Patient Instructions  Medications as below.      Meds & Refills for this Visit:  Requested Prescriptions     Signed Prescriptions Disp Refills    amoxicillin clavulanate 875-125 MG Oral Tab 14 tablet 0     Sig: Take 1 tablet by mouth 2 (two) times daily for 7 days.       Risks, benefits, and side effects of medication explained and discussed.  To f/u with PCP if sx do not resolve as anticipated.  The patient indicates understanding of these issues and agrees to the plan.        Patient Instructions     Autocuidado de la sinusitis  La sinusitis suele poder controlarse con cuidados personales. Por ejemplo, puede mantener húmedos los senos paranasales para aumentar sandra comodidad. Recuerde seguir estrictamente las instrucciones de sandra proveedor de atención médica. Leona recomendaciones pueden tener un efecto muy beneficioso para que usted controle sandra problema de los senos paranasales.   Drea líquidos  Maribel más líquidos hace que la mucosidad sea más ligera. Eso le permite drenarla de los senos paranasales con más facilidad. [place] un vaso de agua cada marybel o dos horas. Un humidificador ayuda de un modo muy parecido. Los líquidos también pueden contrarrestar la resequedad que producen ciertos medicamentos. Si usa un humidificador, asegúrese de seguir las instrucciones del fabricante. Límpielo de manera frecuente.      Beber abundante cantidad de agua puede ayudar a drenar los senos paranasales.     Use enjuagues de agua  salada  Los enjuagues ayudan a mantener la humedad en los senos y la nariz. Mezcle marybel cucharadita de sal en 236 ml (8 onzas) de agua fresca tibia. Use marybel tiffany de goma para rociarse un chorro de agua dentro de la nariz varias veces al día. También puede comprar aerosoles nasales de solución salina ya preparada.   Aplíquese compresas calientes o frías  Aplicar compresas de agua tibia sobre la nariz y la frente puede hacerlo sentir más cómodo. Respirar vapor de un recipiente con [place] o en la ducha también puede ayudar. Algunas personas encuentran que las compresas de hielo son eficaces para aliviar el dolor.   Medicamentos  Es posible que sandra proveedor de atención médica le recete medicamentos para tratar la sinusitis. Si la infección es bacteriana, los antibióticos son un tratamiento efectivo. Si le recetan antibióticos, tome todas las píldoras a la hora correspondiente hasta que se le acaben, aunque se sienta mejor. Los descongestivos ayudan a aliviar la hinchazón. Use aerosoles descongestivos por corto tiempo y solo bajo la supervisión de sandra proveedor de atención médica. Si tiene alergias, sanrda proveedor de atención médica podría recetarle medicamentos para aliviarlas.   © 5415-3081 [employer]. Todos los derechos reservados. Esta información no pretende sustituir la atención médica profesional. Sólo sandra médico puede diagnosticar y tratar un problema de kenneth.          Sinusitis (tratamiento con antibióticos)  Los senos paranasales son cavidades llenas de aire dentro de los huesos de la jonnie. Se conectan con la parte interna de la nariz. La sinusitis es marybel inflamación del tejido que recubre la cavidad de los senos paranasales. Gab inflamación puede presentarse sathish un resfrío. También puede ocurrir debido a la alergia al polen y a otras partículas que haya en el aire. La sinusitis puede provocar congestión de los senos paranasales y sensación de tener la june \"pesada\". Marybel infección de los  senos paranasales causa fiebre, dolor de june y dolor en la jonnie. Suele brigitte secreciones verdosas o amarillentas de la nariz o por la parte posterior de la garganta (goteo posnasal). Se recetan antibióticos para tratar esta afección.     Cuidados en el hogar  [place] todos los antibióticos según las indicaciones. No deje de usarlos, incluso si se siente mejor.  Drea mucha agua, té caliente y otros líquidos según le haya indicado el proveedor de atención médica. Eso diluye la mucosidad. También puede facilitar el drenaje de los senos paranasales.  Aplique calor sobre las áreas de la jonnie que le duelen. Use marybel toalla empapada en [place]. También puede pararse en la ducha y dejar que le caiga el [place] sobre la jonnie. Utilizar un vaporizador y pomada de mentol sathish la noche también puede ayudar a aliviar los síntomas.   Un expectorante que contenga guaifenesina ayuda a diluir la mucosidad y a facilitar el drenaje de los senos paranasales. Hable con sandra proveedor o sandra farmacéutico antes de usar medicamentos de venta kaye si tiene alguna pregunta sobre los efectos secundarios.  Puede usar algún descongestivo de venta kaye, a menos que le hayan recetado algún medicamento parecido. Los aerosoles nasales son los que tienen efecto más rápido. Use alguno que contenga fenilefrina u oximetazolina. Sandra, sople la nariz suavemente. Luego use el aerosol. No use estos medicamentos con mayor frecuencia que la indicada en el prospecto. Si lo hace, los síntomas pueden empeorar. También puede gerardo comprimidos que contengan pseudoefedrina. No use productos que combinen diferentes medicamentos. Podrían aumentar los efectos secundarios. Leanna los prospectos. También puede pedir ayuda al farmacéutico. (Las personas con presión arterial gracie no deberían usar descongestivos. Estos pueden subir la presión arterial). Hable con sandra proveedor o sandra farmacéutico si tiene alguna pregunta.  Los antihistamínicos de venta kaye son  útiles si la sinusitis se debe a marybel alergia. Hable con sandra proveedor o snadra farmacéutico si tiene alguna pregunta respecto del medicamento.  Puede aliviar los síntomas con lavajes o irrigación nasal. Es importante que use estos productos de la forma indicada. Use agua estéril o solución salina estéril y no agua de grifo. El agua de grifo puede contener gérmenes que causen marybel infección en el cerebro. No enjuague con demasiada presión. Puede propagar la infección a otras zonas de los senos paranasales o la june. Hable con sandra proveedor de atención médica o farmacéutico si tiene dudas sobre estos productos.  Puede usar paracetamol o ibuprofeno para controlar el dolor, a menos que le hayan recetado otro analgésico. Si tiene marybel enfermedad crónica del hígado o de los riñones, o alguna vez tuvo úlceras estomacales, hable con sandra proveedor de atención médica antes de gerardo estos medicamentos. [place] le dé aspirina a un alan de 18 años sin consultar josue con sandra proveedor de atención médica. Puede causar daños graves al hígado.  No fume. [place] puede empeorar los síntomas.    Visita de seguimiento  Asista a las citas de seguimiento con sandra proveedor de atención médica según le hayan indicado.   Cuándo buscar atención médica  Llame a sandra proveedor de atención médica si ocurre algo de lo siguiente:  Dolor de june o dolor en la jonnie que empeora  Los síntomas no desaparecen en 10 días  Fiebre de 100.4 °F (38 °C) o superior, o según le indique sandra proveedor de atención médica  Cuándo llamar al  911  Llame al  911 si ocurre algo de lo siguiente:   Convulsiones  Dificultad para respirar  Se siente mareado o débil  Uñas, piel o labios de color azulado, dimas o dayana  Dolor intenso que no se ja  Rigidez en el cj  Somnolencia inusual o confusión  Problemas de la vista; por ejemplo, visión borrosa o doble  Hinchazón de la frente o los párpados  Prevención  Puede adoptar las siguientes medidas para prevenir marybel  infección:  Mantenga buenos hábitos higiénicos de lavado de [place].  Evite el contacto cercano con personas que tengan dolor de garganta, resfriados u otras infecciones de las vías respiratorias superiores.  No fume y evite exponerse al humo de cigarrillos de otras personas.  Mantenga al día todas jackson vacunas.  © 6722-8615 [employer]. Todos los derechos reservados. Esta información no pretende sustituir la atención médica profesional. Sólo sandra médico puede diagnosticar y tratar un problema de kenneth.

## 2024-03-01 NOTE — TELEPHONE ENCOUNTER
Daughter of pt is calling stating that pt has had cough for 2 weeks. Daughter mention that pt did do covid test twice last one been on 02/24 and both were negative. Daughter also mention she was doing better but then mother of pt passed away and cough got worse.      Please call and advise

## 2024-03-01 NOTE — PATIENT INSTRUCTIONS
Autocuidado de la sinusitis  La sinusitis suele poder controlarse con cuidados personales. Por ejemplo, puede mantener húmedos los senos paranasales para aumentar sandra comodidad. Recuerde seguir estrictamente las instrucciones de sandra proveedor de atención médica. [place] recomendaciones pueden tener un efecto muy beneficioso para que usted controle sandra problema de los senos paranasales.   Drea líquidos  [place] más líquidos hace que la mucosidad sea más ligera. Eso le permite drenarla de los senos paranasales con más facilidad. [place] un vaso de agua cada marybel o dos horas. Un humidificador ayuda de un modo muy parecido. Los líquidos también pueden contrarrestar la resequedad que producen ciertos medicamentos. Si usa un humidificador, asegúrese de seguir las instrucciones del fabricante. Límpielo de manera frecuente.      Beber abundante cantidad de agua puede ayudar a drenar los senos paranasales.     Use enjuagues de agua salada  Los enjuagues ayudan a mantener la humedad en los senos y la nariz. Mezcle marybel cucharadita de sal en 236 ml (8 onzas) de agua fresca tibia. Use marybel tiffany de goma para rociarse un chorro de agua dentro de la nariz varias veces al día. También puede comprar aerosoles nasales de solución salina ya preparada.   Aplíquese compresas calientes o frías  Aplicar compresas de agua tibia sobre la nariz y la frente puede hacerlo sentir más cómodo. Respirar vapor de un recipiente con [place] o en la ducha también puede ayudar. Algunas personas encuentran que las compresas de hielo son eficaces para aliviar el dolor.   Medicamentos  Es posible que sandra proveedor de atención médica le recete medicamentos para tratar la sinusitis. Si la infección es bacteriana, los antibióticos son un tratamiento efectivo. Si le recetan antibióticos, tome todas las píldoras a la hora correspondiente hasta que se le acaben, aunque se sienta mejor. Los descongestivos ayudan a aliviar la hinchazón. Use aerosoles descongestivos por  corto tiempo y solo bajo la supervisión de sandra proveedor de atención médica. Si tiene alergias, sandra proveedor de atención médica podría recetarle medicamentos para aliviarlas.   © 6924-9537 [employer]. Todos los derechos reservados. Esta información no pretende sustituir la atención médica profesional. Sólo sandra médico puede diagnosticar y tratar un problema de kenneth.          Sinusitis (tratamiento con antibióticos)  Los senos paranasales son cavidades llenas de aire dentro de los huesos de la jonnie. Se conectan con la parte interna de la nariz. La sinusitis es marybel inflamación del tejido que recubre la cavidad de los senos paranasales. Gab inflamación puede presentarse sathish un resfrío. También puede ocurrir debido a la alergia al polen y a otras partículas que haya en el aire. La sinusitis puede provocar congestión de los senos paranasales y sensación de tener la june \"pesada\". Marybel infección de los senos paranasales causa fiebre, dolor de june y dolor en la jonnie. Suele brigitte secreciones verdosas o amarillentas de la nariz o por la parte posterior de la garganta (goteo posnasal). Se recetan antibióticos para tratar esta afección.     Cuidados en el hogar  [place] todos los antibióticos según las indicaciones. No deje de usarlos, incluso si se siente mejor.  Drea mucha agua, té caliente y otros líquidos según le haya indicado el proveedor de atención médica. Eso diluye la mucosidad. También puede facilitar el drenaje de los senos paranasales.  Aplique calor sobre las áreas de la jonnie que le duelen. Use marybel toalla empapada en [place]. También puede pararse en la ducha y dejar que le caiga el [place] sobre la jonnie. Utilizar un vaporizador y pomada de mentol sathish la noche también puede ayudar a aliviar los síntomas.   Un expectorante que contenga guaifenesina ayuda a diluir la mucosidad y a facilitar el drenaje de los senos paranasales. Hable con sandra proveedor o sandra farmacéutico antes de usar  medicamentos de venta kaye si tiene alguna pregunta sobre los efectos secundarios.  Puede usar algún descongestivo de venta kaye, a menos que le hayan recetado algún medicamento parecido. Los aerosoles nasales son los que tienen efecto más rápido. Use alguno que contenga fenilefrina u oximetazolina. [place], sople la nariz suavemente. Luego use el aerosol. No use estos medicamentos con mayor frecuencia que la indicada en el prospecto. Si lo hace, los síntomas pueden empeorar. También puede gerardo comprimidos que contengan pseudoefedrina. No use productos que combinen diferentes medicamentos. Podrían aumentar los efectos secundarios. [place] los prospectos. También puede pedir ayuda al farmacéutico. (Las personas con presión arterial gracie no deberían usar descongestivos. Estos pueden subir la presión arterial). Hable con sandra proveedor o sandra farmacéutico si tiene alguna pregunta.  Los antihistamínicos de venta kaye son útiles si la sinusitis se debe a marybel alergia. Hable con sandra proveedor o sandra farmacéutico si tiene alguna pregunta respecto del medicamento.  Puede aliviar los síntomas con lavajes o irrigación nasal. Es importante que use estos productos de la forma indicada. Use agua estéril o solución salina estéril y no agua de grifo. El agua de grifo puede contener gérmenes que causen marybel infección en el cerebro. No enjuague con demasiada presión. Puede propagar la infección a otras zonas de los senos paranasales o la june. Hable con sandra proveedor de atención médica o farmacéutico si tiene dudas sobre estos productos.  Puede usar paracetamol o ibuprofeno para controlar el dolor, a menos que le hayan recetado otro analgésico. Si tiene marybel enfermedad crónica del hígado o de los riñones, o alguna vez tuvo úlceras estomacales, hable con sandra proveedor de atención médica antes de gerardo estos medicamentos. [place]unca le dé aspirina a un alan de 18 años sin consultar josue con sandra proveedor de atención médica. Puede causar daños  graves al hígado.  No fume. [place] puede empeorar los síntomas.    Visita de seguimiento  Asista a las citas de seguimiento con sandra proveedor de atención médica según le hayan indicado.   Cuándo buscar atención médica  Llame a sandra proveedor de atención médica si ocurre algo de lo siguiente:  Dolor de june o dolor en la jonnie que empeora  Los síntomas no desaparecen en 10 días  Fiebre de 100.4 °F (38 °C) o superior, o según le indique sandra proveedor de atención médica  Cuándo llamar al  911  Llame al  911 si ocurre algo de lo siguiente:   Convulsiones  Dificultad para respirar  Se siente mareado o débil  Uñas, piel o labios de color azulado, dimas o dayana  Dolor intenso que no se ja  Rigidez en el cj  Somnolencia inusual o confusión  Problemas de la vista; por ejemplo, visión borrosa o doble  Hinchazón de la frente o los párpados  Prevención  Puede adoptar las siguientes medidas para prevenir marybel infección:  Mantenga buenos hábitos higiénicos de lavado de [place].  Evite el contacto cercano con personas que tengan dolor de garganta, resfriados u otras infecciones de las vías respiratorias superiores.  No fume y evite exponerse al humo de cigarrillos de otras personas.  Mantenga al día todas jackson vacunas.  © 7661-3062 [employer]. Todos los derechos reservados. Esta información no pretende sustituir la atención médica profesional. Sólo sandra médico puede diagnosticar y tratar un problema de kenneth.

## 2024-04-17 NOTE — PROGRESS NOTES
Subjective:   Fernando Dhillon is a 68 year old female who presents for a Medicare Subsequent Annual Wellness visit (Pt already had Initial Annual Wellness) and scheduled follow up of multiple significant but stable problems.       History/Other:   Fall Risk Assessment:   She has been screened for Falls and is High Risk. Fall Prevention information provided to patient in After Visit Summary.    Do you have 3 or more medical conditions?: 1-Yes  Have you fallen in the last 12 months?: 1-Yes  Do you accidently lose urine?: 1-Yes  Do you have difficulty seeing?: 0-No  Do you have any difficulty walking or getting up?: 0-No  Do you have any tripping hazards?: 0-No  Are you on multiple medications?: 1-Yes  Does pain affect your day to day activities?: 0-No  Have you had any memory issues?: 0-No  Fall/Risk Scorin  Scoring Interpretation: 4+ At Risk     Cognitive Assessment:   She had a completely normal cognitive assessment - see flowsheet entries     Functional Ability/Status:   Fernando Dhillon has some abnormal functions as listed below:  She has Meal Preparation difficulties based on screening of functional status.She has Walking problems based on screening of functional status.     Advanced Directives:   She does NOT have a Living Will. [Do you have a living will?: No]  She does NOT have a Power of  for Health Care. [Do you have a healthcare power of ?: No]  Not discussed      Patient Active Problem List   Diagnosis    Fibromyalgia    Anxiety    Essential hypertension    Breast cyst    Mild recurrent major depression (HCC)     Allergies:  She has No Known Allergies.    Current Medications:  Outpatient Medications Marked as Taking for the 4/10/24 encounter (Office Visit) with Carlos Lindo MD   Medication Sig    metFORMIN 500 MG Oral Tab Take 1 tablet (500 mg total) by mouth 2 (two) times daily.    hydroCHLOROthiazide 12.5 MG Oral Tab Take 1 tablet (12.5 mg total) by mouth daily.     atorvastatin 10 MG Oral Tab Take 1 tablet (10 mg total) by mouth nightly.    Omeprazole 40 MG Oral Capsule Delayed Release Take 1 capsule (40 mg total) by mouth daily.    metoprolol succinate ER 50 MG Oral Tablet 24 Hr Take 1 tablet (50 mg total) by mouth daily.    DULoxetine 60 MG Oral Cap DR Particles Take 1 capsule (60 mg total) by mouth daily.    ALPRAZolam 0.25 MG Oral Tab Take 1 tablet (0.25 mg total) by mouth daily as needed for Anxiety.    cyclobenzaprine 5 MG Oral Tab Start cyclobenzaprine 5 mg 1-2 tablets three times per day as needed for spasms. Do not operate heavy machinery while on this medication as it may make you sleepy.    naproxen 500 MG Oral Tab TAKE 1 TABLET BY MOUTH TWICE DAILY WITH MEALS. TAKE FOR 2 WEEKS AS DIRECTED AND THEN AS NEEDED    gabapentin 400 MG Oral Cap Take one three times daily.    triamcinolone 0.1 % External Cream Apply topically 2 (two) times daily as needed.       Medical History:  She  has a past medical history of Arthritis, Breast cyst, Colon adenoma (2022), Colon polyp, Esophageal reflux, Fibromyalgia, High blood pressure, High cholesterol, Ovarian cyst, and Prediabetes.  Surgical History:  She  has a past surgical history that includes hysterectomy (); d & c ();  ();  ();  ();  ();  (); cholecystectomy (); neva localization wire 1 site right (cpt=19281); colonoscopy; and colonoscopy (2022).   Family History:  Her family history is not on file.  Social History:  She  reports that she has never smoked. She has never used smokeless tobacco. She reports that she does not drink alcohol and does not use drugs.    Tobacco:  She has never smoked tobacco.    CAGE Alcohol Screen:   Cage screening not needed because reported NO to Alcohol use on social history.      Patient Care Team:  Carlos Lindo MD as PCP - General (Internal Medicine)  Brunner, Heather, PT as Physical Therapist (Physical Therapy)    Review of  Systems  GENERAL: feels well otherwise  SKIN: denies any unusual skin lesions  EYES: denies blurred vision or double vision  HEENT: denies nasal congestion, sinus pain or ST  LUNGS: denies shortness of breath with exertion  CARDIOVASCULAR: denies chest pain on exertion  GI: denies abdominal pain, denies heartburn  : denies dysuria, vaginal discharge or itching, no complaint of urinary incontinence   MUSCULOSKELETAL: denies back pain  NEURO: denies headaches  PSYCHE: denies depression or anxiety  HEMATOLOGIC: denies hx of anemia  ENDOCRINE: denies thyroid history  ALL/ASTHMA: denies hx of allergy or asthma    Objective:   Physical Exam  General Appearance:  Alert, cooperative, no distress, appears stated age   Head:  Normocephalic, without obvious abnormality, atraumatic   Eyes:  PERRL, conjunctiva/corneas clear, EOM's intact both eyes   Ears:  Normal TM's and external ear canals, both ears   Nose: Nares normal, septum midline,mucosa normal, no drainage or sinus tenderness   Throat: Lips, mucosa, and tongue normal; teeth and gums normal   Neck: Supple, symmetrical, trachea midline, no adenopathy;  thyroid: not enlarged, symmetric, no tenderness/mass/nodules; no carotid bruit or JVD   Back:   Symmetric, no curvature, ROM normal, no CVA tenderness   Lungs:   Clear to auscultation bilaterally, respirations unlabored   Heart:  Regular rate and rhythm, S1 and S2 normal, no murmur, rub, or gallop   Abdomen:   Soft, non-tender, bowel sounds active all four quadrants,  no masses, no organomegaly   Pelvic: Deferred   Extremities: Extremities normal, atraumatic, no cyanosis or edema   Pulses: 2+ and symmetric   Skin: Skin color, texture, turgor normal, no rashes or lesions   Lymph nodes: Cervical, supraclavicular, and axillary nodes normal   Neurologic: Normal       /64 (BP Location: Right arm, Patient Position: Sitting, Cuff Size: adult)   Pulse 66   Temp 98.4 °F (36.9 °C) (Temporal)   Ht 5' 4\" (1.626 m)   Wt 171  lb (77.6 kg)   SpO2 99%   BMI 29.35 kg/m²  Estimated body mass index is 29.35 kg/m² as calculated from the following:    Height as of this encounter: 5' 4\" (1.626 m).    Weight as of this encounter: 171 lb (77.6 kg).    Medicare Hearing Assessment:   Hearing Screening    Screening Method: Finger Rub, Whisper Test  Finger Rub Result: Pass  Whisper Test Result: Pass         Visual Acuity:   Right Eye Visual Acuity: Uncorrected Right Eye Chart Acuity: 20/30   Left Eye Visual Acuity: Uncorrected Left Eye Chart Acuity: 20/30   Both Eyes Visual Acuity: Uncorrected Both Eyes Chart Acuity: 20/30   Able To Tolerate Visual Acuity: Yes        Assessment & Plan:   Fernando Dhillon is a 68 year old female who presents for a Medicare Assessment.     1. Medicare annual wellness visit, subsequent (Primary)  -     CBC With Differential With Platelet  -     Comp Metabolic Panel (14)  -     Lipid Panel    2. Mild recurrent major depression (HCC)  -     TSH and Free T4  -     Vitamin D; Future; Expected date: 04/10/2024    3. Essential hypertension  -     Metoprolol Succinate ER; Take 1 tablet (50 mg total) by mouth daily.  Dispense: 90 tablet; Refill: 1    4. Anxiety  -     ALPRAZolam; Take 1 tablet (0.25 mg total) by mouth daily as needed for Anxiety.  Dispense: 30 tablet; Refill: 0    5. Fibromyalgia      - Stable.     6. Cyst of breast, unspecified laterality       -  Mammogram up to date.     7. Vitamin D deficiency  -     Stable.        8. Controlled type 2 diabetes mellitus without complication, without long-term current use of insulin ([facility])  -     Hemoglobin A1C  -     Currently on metformin 500 BID.     9. Post-menopausal  -     XR DEXA BONE DENSITOMETRY (CPT=77080); Future; Expected date: 04/10/2024    10. Gastroesophageal reflux disease, unspecified whether esophagitis present  -     Omeprazole; Take 1 capsule (40 mg total) by mouth daily.  Dispense: 90 capsule; Refill: 0    11. Essential  hypertension  Comments:  Controlled on HCTZ and metoprolol.    Orders:  -     Metoprolol Succinate ER; Take 1 tablet (50 mg total) by mouth daily.  Dispense: 90 tablet; Refill: 1    12. Cervical spondylosis without myelopathy  -     DULoxetine HCl; Take 1 capsule (60 mg total) by mouth daily.  Dispense: 90 capsule; Refill: 0    13. Tendinopathy of rotator cuff, unspecified laterality  -     DULoxetine HCl; Take 1 capsule (60 mg total) by mouth daily.  Dispense: 90 capsule; Refill: 0    14. DDD (degenerative disc disease), cervical  -     DULoxetine HCl; Take 1 capsule (60 mg total) by mouth daily.  Dispense: 90 capsule; Refill: 0    15. Cervical facet syndrome  -     DULoxetine HCl; Take 1 capsule (60 mg total) by mouth daily.  Dispense: 90 capsule; Refill: 0    16. Chronic pain of both shoulders  -     DULoxetine HCl; Take 1 capsule (60 mg total) by mouth daily.  Dispense: 90 capsule; Refill: 0    17. Cervical radiculopathy  -     DULoxetine HCl; Take 1 capsule (60 mg total) by mouth daily.  Dispense: 90 capsule; Refill: 0    18. Foraminal stenosis of cervical region  -     DULoxetine HCl; Take 1 capsule (60 mg total) by mouth daily.  Dispense: 90 capsule; Refill: 0    19. Cervical vertebral fusion  -     DULoxetine HCl; Take 1 capsule (60 mg total) by mouth daily.  Dispense: 90 capsule; Refill: 0    20. Encounter for annual health examination  Other orders  -     metFORMIN HCl; Take 1 tablet (500 mg total) by mouth 2 (two) times daily.  Dispense: 180 tablet; Refill: 0  -     hydroCHLOROthiazide; Take 1 tablet (12.5 mg total) by mouth daily.  Dispense: 90 tablet; Refill: 1  -     Atorvastatin Calcium; Take 1 tablet (10 mg total) by mouth nightly.  Dispense: 90 tablet; Refill: 1    The patient indicates understanding of these issues and agrees to the plan.  Reinforced healthy diet, lifestyle, and exercise.      Return in about 6 months (around 10/10/2024) for Symptom monitoring.     Carlos Lindo MD, 4/17/2024      Supplementary Documentation:   General Health:  In the past six months, have you lost more than 10 pounds without trying?: 2 - No  Has your appetite been poor?: Yes  Type of Diet: Diabetic  How does the patient maintain a good energy level?: Daily Walks  How would you describe your daily physical activity?: Moderate  How would you describe your current health state?: Fair  How do you maintain positive mental well-being?: Visiting Family  Have you had any immunizations at another office such as Influenza, Hepatitis B, Tetanus, or Pneumococcal?: Sophia Dhillon's SCREENING SCHEDULE   Tests on this list are recommended by your physician but may not be covered, or covered at this frequency, by your insurer.   Please check with your insurance carrier before scheduling to verify coverage.   PREVENTATIVE SERVICES FREQUENCY &  COVERAGE DETAILS LAST COMPLETION DATE   Diabetes Screening    Fasting Blood Sugar /  Glucose    One screening every 12 months if never tested or if previously tested but not diagnosed with pre-diabetes   One screening every 6 months if diagnosed with pre-diabetes Lab Results   Component Value Date     (H) 04/10/2024        Cardiovascular Disease Screening    Lipid Panel  Cholesterol  Lipoprotein (HDL)  Triglycerides Covered every 5 years for all Medicare beneficiaries without apparent signs or symptoms of cardiovascular disease Lab Results   Component Value Date    CHOLEST 161 04/10/2024    HDL 57 04/10/2024    LDL 82 04/10/2024    TRIG 128 04/10/2024         Electrocardiogram (EKG)   Covered if needed at [facility] to Medicare, and non-screening if indicated for medical reasons -      Ultrasound Screening for Abdominal Aortic Aneurysm (AAA) Covered once in a lifetime for one of the following risk factors    Men who are 65-75 years old and have ever smoked    Anyone with a family history -     Colorectal Cancer Screening  Covered for ages 50-85; only need ONE of the following:     Colonoscopy   Covered every 10 years    Covered every 2 years if patient is at high risk or previous colonoscopy was abnormal 05/25/2022    Health Maintenance   Topic Date Due    Colorectal Cancer Screening  05/25/2029       Flexible Sigmoidoscopy   Covered every 4 years -    Fecal Occult Blood Test Covered annually -   Bone Density Screening    Bone density screening    Covered every 2 years after age 65 if diagnosed with risk of osteoporosis or estrogen deficiency.    Covered yearly for long-term glucocorticoid medication use (Steroids) No results found for this or any previous visit.      Health Maintenance Due   Topic Date Due    DEXA Scan  Never done      Pap and Pelvic    Pap   Covered every 2 years for women at normal risk; Annually if at high risk -  No recommendations at this time    Chlamydia Annually if high risk -  No recommendations at this time   Screening Mammogram    Mammogram     Recommend annually for all female patients aged 40 and older    One baseline mammogram covered for patients aged 35-39 05/18/2023    Health Maintenance   Topic Date Due    Mammogram  05/18/2024       Immunizations    Influenza Covered once per flu season  Please get every year 10/05/2023  No recommendations at this time    Pneumococcal Each vaccine ([id number] & [id number]) covered once after 65 Prevnar 13: -    [id number]: -     No recommendations at this time    Hepatitis B One screening covered for patients with certain risk factors   -  No recommendations at this time    Tetanus Toxoid Not covered by Medicare Part B unless medically necessary (cut with metal); may be covered with your pharmacy prescription benefits -    Tetanus, Diptheria and Pertusis TD and TDaP Not covered by Medicare Part B -  No recommendations at this time    Zoster Not covered by Medicare Part B; may be covered with your pharmacy  prescription benefits -  Zoster Vaccines(1 of 2) Never done     Diabetes      Hemoglobin A1C Annually; if last result  is elevated, may be asked to retest more frequently.    Medicare covers every 3 months Lab Results   Component Value Date     (H) 04/10/2024    A1C 7.0 (H) 04/10/2024       No recommendations at this time    Creat/alb ratio Annually Lab Results   Component Value Date    MICROALBCREA  04/10/2024      Comment:      Unable to calculate due to Urine Microalbumin <0.3 mg/dL           LDL Annually Lab Results   Component Value Date    LDL 82 04/10/2024       Dilated Eye Exam Annually Last Diabetic Eye Exam:  Last Dilated Eye Exam: 01/08/24  Eye Exam shows Diabetic Retinopathy?: No       Annual Monitoring of Persistent Medications (ACE/ARB, digoxin diuretics, anticonvulsants)    Potassium Annually Lab Results   Component Value Date    K 4.6 04/10/2024         Creatinine   Annually Lab Results   Component Value Date    CREATSERUM 0.78 04/10/2024         BUN Annually Lab Results   Component Value Date    BUN 15 04/10/2024       Drug Serum Conc Annually No results found for: \"DIGOXIN\", \"DIG\", \"VALP\"

## 2024-04-17 NOTE — PATIENT INSTRUCTIONS
Fernando Dhillon's SCREENING SCHEDULE   Tests on this list are recommended by your physician but may not be covered, or covered at this frequency, by your insurer.   Please check with your insurance carrier before scheduling to verify coverage.   PREVENTATIVE SERVICES FREQUENCY &  COVERAGE DETAILS LAST COMPLETION DATE   Diabetes Screening    Fasting Blood Sugar /  Glucose    One screening every 12 months if never tested or if previously tested but not diagnosed with pre-diabetes   One screening every 6 months if diagnosed with pre-diabetes Lab Results   Component Value Date     (H) 04/10/2024        Cardiovascular Disease Screening    Lipid Panel  Cholesterol  Lipoprotein (HDL)  Triglycerides Covered every 5 years for all Medicare beneficiaries without apparent signs or symptoms of cardiovascular disease Lab Results   Component Value Date    CHOLEST 161 04/10/2024    HDL 57 04/10/2024    LDL 82 04/10/2024    TRIG 128 04/10/2024         Electrocardiogram (EKG)   Covered if needed at [facility] to Medicare, and non-screening if indicated for medical reasons -      Ultrasound Screening for Abdominal Aortic Aneurysm (AAA) Covered once in a lifetime for one of the following risk factors   • Men who are 65-75 years old and have ever smoked   • Anyone with a family history -     Colorectal Cancer Screening  Covered for ages 50-85; only need ONE of the following:    Colonoscopy   Covered every 10 years    Covered every 2 years if patient is at high risk or previous colonoscopy was abnormal 05/25/2022    Health Maintenance   Topic Date Due   • Colorectal Cancer Screening  05/25/2029       Flexible Sigmoidoscopy   Covered every 4 years -    Fecal Occult Blood Test Covered annually -   Bone Density Screening    Bone density screening    Covered every 2 years after age 65 if diagnosed with risk of osteoporosis or estrogen deficiency.    Covered yearly for long-term glucocorticoid medication use (Steroids) No results found  for this or any previous visit.      Health Maintenance Due   Topic Date Due   • DEXA Scan  Never done      Pap and Pelvic    Pap   Covered every 2 years for women at normal risk; Annually if at high risk -  No recommendations at this time    Chlamydia Annually if high risk -  No recommendations at this time   Screening Mammogram    Mammogram     Recommend annually for all female patients aged 40 and older    One baseline mammogram covered for patients aged 35-39 05/18/2023    Health Maintenance   Topic Date Due   • Mammogram  05/18/2024       Immunizations    Influenza Covered once per flu season  Please get every year 10/05/2023  No recommendations at this time    Pneumococcal Each vaccine (Gryxsis33 & Dugudfxur25) covered once after 65 Prevnar 13: -    [id number]: -     No recommendations at this time    Hepatitis B One screening covered for patients with certain risk factors   -  No recommendations at this time    Tetanus Toxoid Not covered by Medicare Part B unless medically necessary (cut with metal); may be covered with your pharmacy prescription benefits -    Tetanus, Diptheria and Pertusis TD and TDaP Not covered by Medicare Part B -  No recommendations at this time    Zoster Not covered by Medicare Part B; may be covered with your pharmacy  prescription benefits -  Zoster Vaccines(1 of 2) Never done     Diabetes      Hemoglobin A1C Annually; if last result is elevated, may be asked to retest more frequently.    Medicare covers every 3 months Lab Results   Component Value Date     (H) 04/10/2024    A1C 7.0 (H) 04/10/2024       No recommendations at this time    Creat/alb ratio Annually Lab Results   Component Value Date    MICROALBCREA  04/10/2024      Comment:      Unable to calculate due to Urine Microalbumin <0.3 mg/dL           LDL Annually Lab Results   Component Value Date    LDL 82 04/10/2024       Dilated Eye Exam Annually [unfilled]     Annual Monitoring of Persistent Medications  (ACE/ARB, digoxin diuretics, anticonvulsants)    Potassium Annually Lab Results   Component Value Date    K 4.6 04/10/2024         Creatinine   Annually Lab Results   Component Value Date    CREATSERUM 0.78 04/10/2024         BUN Annually Lab Results   Component Value Date    BUN 15 04/10/2024       Drug Serum Conc Annually No results found for: \"DIGOXIN\", \"DIG\", \"VALP\"

## 2024-04-17 NOTE — TELEPHONE ENCOUNTER
Results discussed with patient, she was remained to set up appt with radiology for dexa exam, order for mammogram placed to get test done at the same time.      ----- Message from Carlos Lindo MD sent at 4/17/2024  9:23 AM CDT -----  Please inform her that her labs have been reviewed.  Diabetes well-controlled.  No other significant abnormality.  Continue to take medications as is.  Follow-up in 6 months.

## 2024-05-08 NOTE — TELEPHONE ENCOUNTER
Future Appt. 10/10/2024    Last Visit: 04/10/2024    Medication Requested:  Requested Prescriptions     Pending Prescriptions Disp Refills    ALPRAZolam 0.25 MG Oral Tab 30 tablet 0     Sig: Take 1 tablet (0.25 mg total) by mouth daily as needed for Anxiety.           Last refill:      Disp Refills Start End     ALPRAZolam 0.25 MG Oral Tab 30 tablet 0 4/10/2024 --    Sig - Route: Take 1 tablet (0.25 mg total) by mouth daily as needed for Anxiety. - Oral      Controlled Substance Medication [id number]/08/2024 11:46 AM    This medication is a controlled substance - forward to provider to refill

## 2024-05-08 NOTE — TELEPHONE ENCOUNTER
Pt called requesting refills for ALPRAZolam 0.25 MG Oral Tab   To please inform her when sent to pharmacy

## 2024-06-11 NOTE — TELEPHONE ENCOUNTER
Left a detailed voicemail, prescriptions were sent to pharmacy.   
Patient calling again to get an update. Please advise  
Pt called requesting refills for ALPRAZolam 0.25 MG Oral Tab   naproxen 500 MG Oral Tab   To please inform her when sent to pharmacy   
2

## 2024-07-10 NOTE — PROGRESS NOTES
Left message for patient regarding Chronic Care Management program, 1st attempt.    KURTIS [facility]  Linda Mayo  [facility]  [phone] work  [email or web address]

## 2024-07-16 NOTE — TELEPHONE ENCOUNTER
Patient called for refill Alprazolam 0.25 mg oral tab. Pharmacy:   [id number] - [facility] [address] [address] [phone], [phone]

## 2024-08-01 NOTE — PATIENT INSTRUCTIONS
"Oncology Rooming Note    August 1, 2024 1:40 PM   Lizbet Nunez is a 64 year old female who presents for:    Chief Complaint   Patient presents with    Oncology Clinic Visit    Breast Cancer    Radiation Therapy     Follow up     Initial Vitals: /61 (BP Location: Right arm, Patient Position: Sitting)   Pulse 98   Temp 98.2  F (36.8  C)   Resp 20   Wt 66.3 kg (146 lb 3.2 oz)   SpO2 96%   BMI 25.90 kg/m   Estimated body mass index is 25.9 kg/m  as calculated from the following:    Height as of 7/11/24: 1.6 m (5' 3\").    Weight as of this encounter: 66.3 kg (146 lb 3.2 oz). Body surface area is 1.72 meters squared.  No Pain (0) Comment: Data Unavailable   No LMP recorded.  Allergies reviewed: Yes  Medications reviewed: Yes    Medications: Medication refills not needed today.  Pharmacy [facility] entered into [employer]: [id number] [place], [place] - [address] [facility] [address]    Frailty Screening:   Is the patient here for a new oncology consult visit in cancer care? 2. No      Clinical concerns: Follow up after breast radiation completed on 06/24/2024 Dr. Nidia Huerta, RN            " 1) Start formal physical therapy focusing on the shoulder  2) My office will call you to schedule the BILATERAL C3-C4, C4-C5, and C5-C6 facet joint injections under IVCS once the procedure is approved by your insurance carrier. 3) Tylenol 500-1000 mg every 6-8 hours as needed for pain. No more than 3000 mg daily. 4) Start cyclobenzaprine 5 mg 1-2 tablets three times per day as needed for spasms. Do not operate heavy machinery while on this medication as it may make you sleepy. 5) Take Naprosyn 500 mg 1 tablet twice per day with food for the next two weeks and then as needed but no more than 2 tablets per day. Do not take with any other NSAIDS (Ibuprofen, Advil, Aleve, etc). OK to take Tylenol 500 mg every 6 hours as needed for pain. If you develop any side effects including stomach aches, nausea, vomiting, or other gastrointestinal symptoms, stop the medication and call my office. 6) Follow up with me in about 6 weeks after you begin therapy and 2 weeks after the procedure. If symptoms persist, then would recommend bilateral; subacromial CSI. If neck pain persists, then would recommend MRI cervical spine  7) Please get X-rays of the BILATERAL shoulder today on your way out.    8) Make a separate appointment for the lower half

## 2024-08-21 NOTE — TELEPHONE ENCOUNTER
LOV: 04/10/24    RTC: 6 mo f/u    FU: 10/10/24    Pending Monthly Supply: pending     Last refill: 07/18/2024 qty 30

## 2024-10-10 NOTE — PROGRESS NOTES
[facility] part of [facility]  Return Patient Progress Note      HPI:     Chief Complaint   Patient presents with    Follow - Up     6 mo follow up- fasting        Fernando Dhillon is a 69 year old female presenting for:    Has a significant  has a past medical history of Arthritis, Breast cyst, Colon adenoma (05/25/2022), Colon polyp, Esophageal reflux, Fibromyalgia, High blood pressure, High cholesterol, Ovarian cyst, and Prediabetes.      Here for follow up.      Labs:   CMP:  Lab Results   Component Value Date/Time     04/10/2024 10:26 AM    K 4.6 04/10/2024 10:26 AM     04/10/2024 10:26 AM    CO2 27.0 04/10/2024 10:26 AM    CREATSERUM 0.78 04/10/2024 10:26 AM    CA 10.4 04/10/2024 10:26 AM     (H) 04/10/2024 10:26 AM    TP 7.2 04/10/2024 10:26 AM    ALB 4.4 04/10/2024 10:26 AM    ALKPHO 91 04/10/2024 10:26 AM    AST 21 04/10/2024 10:26 AM    ALT 22 04/10/2024 10:26 AM    BILT 0.5 04/10/2024 10:26 AM    TSH 2.507 04/10/2024 10:26 AM    T4F 1.0 04/10/2024 10:26 AM        CBC:  Lab Results   Component Value Date    WBC 5.3 04/10/2024    HGB 13.0 04/10/2024    HCT 39.9 04/10/2024    .0 04/10/2024    NEPERCENT 55.1 04/10/2024    LYPERCENT 33.9 04/10/2024    MOPERCENT 7.4 04/10/2024    EOPERCENT 2.8 04/10/2024    BAPERCENT 0.4 04/10/2024    NE 2.91 04/10/2024    LYMABS 1.79 04/10/2024    MOABSO 0.39 04/10/2024    EOABSO 0.15 04/10/2024    BAABSO 0.02 04/10/2024          Hemoglobin A1C, Microalbumin  Lab Results   Component Value Date/Time    A1C 7.0 (H) 04/10/2024 10:26 AM        Lipid panel  Lab Results   Component Value Date/Time    CHOLEST 161 04/10/2024 10:26 AM    HDL 57 04/10/2024 10:26 AM    TRIG 128 04/10/2024 10:26 AM    LDL 82 04/10/2024 10:26 AM    NONHDLC 104 04/10/2024 10:26 AM        Medications:  Current Outpatient Medications   Medication Sig Dispense Refill    METFORMIN 500 MG Oral Tab TOME JERRY TABLETA DOS VECES AL  tablet 0    gabapentin 400 MG Oral  Cap TAKE 1 CAPSULE BY MOUTH THREE TIMES DAILY. 90 capsule 0    ALPRAZolam 0.25 MG Oral Tab Take 1 tablet (0.25 mg total) by mouth daily as needed for Anxiety. 30 tablet 0    DULoxetine 60 MG Oral Cap DR Particles Take 1 capsule (60 mg total) by mouth daily. 90 capsule 0    NAPROXEN 500 MG Oral Tab TAKE 1 TABLET BY MOUTH TWICE DAILY WITH MEALS. TAKE FOR 2 WEEKS AS DIRECTED AND THEN AS NEEDED 60 tablet 0    hydroCHLOROthiazide 12.5 MG Oral Tab Take 1 tablet (12.5 mg total) by mouth daily. 90 tablet 1    atorvastatin 10 MG Oral Tab Take 1 tablet (10 mg total) by mouth nightly. 90 tablet 1    Omeprazole 40 MG Oral Capsule Delayed Release Take 1 capsule (40 mg total) by mouth daily. 90 capsule 0    metoprolol succinate ER 50 MG Oral Tablet 24 Hr Take 1 tablet (50 mg total) by mouth daily. 90 tablet 1    cyclobenzaprine 5 MG Oral Tab Start cyclobenzaprine 5 mg 1-2 tablets three times per day as needed for spasms. Do not operate heavy machinery while on this medication as it may make you sleepy. 120 tablet 0    Accu-Chek FastClix Lancets Does not apply Misc 1 Lancet by Finger stick route in the morning and 1 Lancet before bedtime. One lancet daily. 100 each 3    Glucose Blood (ACCU-CHEK GUIDE) In Vitro Strip Check sugars once daily 100 strip 3    triamcinolone 0.1 % External Cream Apply topically 2 (two) times daily as needed. 60 g 3      PMH:  Past Medical History:    Arthritis    Breast cyst    Colon adenoma    x1    Colon polyp    Esophageal reflux    Fibromyalgia    High blood pressure    High cholesterol    Ovarian cyst    Prediabetes               REVIEW OF SYSTEMS:   Review of Systems   Constitutional:  Negative for chills, fatigue, fever and unexpected weight change.   HENT:  Negative for congestion, ear pain, hearing loss, rhinorrhea, sinus pain and sore throat.    Eyes:  Negative for pain, redness and visual disturbance.   Respiratory:  Negative for apnea, cough, chest tightness, shortness of breath and wheezing.     Cardiovascular:  Negative for chest pain, palpitations and leg swelling.   Gastrointestinal:  Negative for abdominal distention, abdominal pain, blood in stool, constipation and nausea.   Endocrine: Negative for cold intolerance, heat intolerance and polyuria.   Genitourinary:  Negative for dysuria, hematuria and urgency.   Musculoskeletal:  Negative for arthralgias, back pain, gait problem, joint swelling, myalgias and neck pain.        Knee pain reported.     Skin:  Negative for rash and wound.   Allergic/Immunologic: Negative for food allergies and immunocompromised state.   Neurological:  Negative for dizziness, seizures, facial asymmetry, speech difficulty, weakness, light-headedness, numbness and headaches.   Hematological:  Negative for adenopathy. Does not bruise/bleed easily.   Psychiatric/Behavioral:  Negative for behavioral problems, sleep disturbance and suicidal ideas. The patient is not nervous/anxious.             PHYSICAL EXAM:   /62   Pulse 82   Ht 5' 4\" (1.626 m)   Wt 169 lb (76.7 kg)   SpO2 97%   BMI 29.01 kg/m²  Estimated body mass index is 29.01 kg/m² as calculated from the following:    Height as of this encounter: 5' 4\" (1.626 m).    Weight as of this encounter: 169 lb (76.7 kg).     Wt Readings from Last 3 Encounters:   10/10/24 169 lb (76.7 kg)   04/10/24 171 lb (77.6 kg)   03/01/24 176 lb (79.8 kg)       Physical Exam  Vitals reviewed.   Constitutional:       General: She is not in acute distress.     Appearance: She is well-developed.   HENT:      Head: Normocephalic and atraumatic.   Eyes:      Conjunctiva/sclera: Conjunctivae normal.      Pupils: Pupils are equal, round, and reactive to light.   Neck:      Thyroid: No thyromegaly.   Cardiovascular:      Rate and Rhythm: Normal rate and regular rhythm.      Heart sounds: Normal heart sounds, S1 normal and S2 normal. No murmur heard.     No friction rub. No gallop.   Pulmonary:      Effort: Pulmonary effort is normal. No  respiratory distress.      Breath sounds: Normal breath sounds. No wheezing or rales.   Chest:      Chest wall: No tenderness.   Abdominal:      General: Bowel sounds are normal. There is no distension.      Palpations: Abdomen is soft. There is no mass.      Tenderness: There is no abdominal tenderness. There is no guarding or rebound.   Musculoskeletal:         General: No tenderness. Normal range of motion.      Cervical back: Normal range of motion.   Lymphadenopathy:      Cervical: No cervical adenopathy.   Skin:     General: Skin is warm.      Findings: No erythema or rash.   Neurological:      Mental Status: She is alert and oriented to person, place, and time.      Cranial Nerves: No cranial nerve deficit.      Deep Tendon Reflexes: Reflexes are normal and symmetric.   Psychiatric:         Behavior: Behavior normal.         Thought Content: Thought content normal.         Judgment: Judgment normal.               ASSESSMENT AND PLAN:   Patient is a 69 year old female who presents primarily presents for:    (E11.9) Controlled type 2 diabetes mellitus without complication, without long-term current use of insulin (HCC)  (primary encounter diagnosis)  Plan: POC Glycohemoglobin [40092]          Continue metformin.  A1C well controlled.      (M17.0) Primary osteoarthritis of both knees  Plan: Physiatry Referral - In Network            (I10) Essential hypertension  Comment: Controlled on HCTZ and metoprolol.    Plan: metoprolol succinate ER 50 MG Oral Tablet 24                  (F41.9) Anxiety  Plan: DULoxetine 60 MG Oral Cap DR Particles            (F33.0) Mild recurrent major depression (HCC)  Plan: DULoxetine 60 MG Oral Cap DR Particles                Health Maintenance:    Health Maintenance   Topic Date Due    Zoster Vaccines (1 of 2) Never done    DEXA Scan  Never done    Mammogram  05/18/2024    COVID-19 Vaccine (4 - 2023-24 season) 09/01/2024    Influenza Vaccine (1) 10/01/2024    Diabetes Care Foot Exam   10/05/2024    Diabetes Care A1C  10/10/2024    Diabetes Care Dilated Eye Exam  01/08/2025    Diabetes Care: GFR  04/10/2025    Diabetes Care: Microalb/Creat Ratio  04/10/2025    Colorectal Cancer Screening  05/25/2029    MA Annual Health Assessment  Completed    Annual Depression Screening  Completed    Fall Risk Screening (Annual)  Completed    Pneumococcal Vaccine: 65+ Years  Completed         Meds & Refills for this Visit:  Requested Prescriptions      No prescriptions requested or ordered in this encounter       No orders of the defined types were placed in this encounter.      Imaging & Consults:  None          No follow-ups on file.  Important follow up notes/labs for next visit      Patient indicates understanding of the above recommendations and agrees to the above plan.  Reasurrance and education provided. All questions answered.    Notified to call with any questions, complications, allergies, or worsening or changing symptoms as well as any side effects or complications from the treatments .  Red flags/ ER precautions discussed.    If diagnostic labs or imaging ordered advised patient to contact my office for results  24-48 hours after completion    This note was dictated using dragon speech recognition transcription software.  Typographical and transcription errors may be present.  Please call if any questions.             Carlos Lindo MD  [facility]

## 2024-10-24 NOTE — TELEPHONE ENCOUNTER
Patient is calling for refills on   Alprazolam 0.25 mg  Glucose Blood (accu-chek guide) strips  Hydrochlorothiazide 12.5mg  Atorvastatin 10 mg  Cyclobenzaprine 5mg    Please advise patient once sent to pharmacy

## 2024-10-24 NOTE — TELEPHONE ENCOUNTER
Sent refills to pharmacy except alprazolam, this medication requires pcp's approval.     Pt, was notified.

## 2025-01-30 NOTE — TELEPHONE ENCOUNTER
Patient needs refills for DULoxetine 60 MG Oral Cap DR Particles  and alprazolam .25 mg. Please advise

## 2025-03-14 NOTE — TELEPHONE ENCOUNTER
Pt is calling requesting refills alprazolam, metformin, gabapentin.     Preferred pharmacy   [place]     Please call and advise

## 2025-04-08 NOTE — TELEPHONE ENCOUNTER
Pt had appointment schedule for 04/10 but had to cancel due to family emergency, pt states will be back on Saturday. Pt mention that would like an appointment for next week since is having headaches and neck pain for about 2-3 weeks now.       Please call and advise

## 2025-04-08 NOTE — TELEPHONE ENCOUNTER
Returned call to patient via  (#[phone]) to triage pt's reported headaches.  Unable to reach. Vmail message left requesting pt call the office to speak w/ triage nurse re: symptoms.    Included in message was a reminder that annual physicals are 40 minutes duration so we cannot reschedule her PE appt she cancelled on 4/10.  Based upon her headache triage, we may be able to schedule her for a shorter acute visit to evaluate the headaches only.    Await pt's return call to triage headaches, attempt PE appt rescheduling.

## 2025-04-14 NOTE — TELEPHONE ENCOUNTER
Pt called on 4/8 complaining about headaches, requesting appt.  F/U triage calls to patient at that time went unanswered-- pt did not call back until today.  Triage unable to take pt's call when she called this am.    Returning pt's call this afternoon via  (#[phone]).

## 2025-04-14 NOTE — TELEPHONE ENCOUNTER
Pt c/o severe headaches (10/10) daily now since 4/9/25 that last 30 minutes then dissipate w/o tylenol/ ibuprofen but she takes \"an extra Gabapentin 400 mg\" (so 800 mg vs 400 mg/ day as prescribed for fibromyalgia).     No other neuro/ circulatory  symptoms.  Staes she needs a CT scan of her head.  Given protocol/ severity of headache-- directed pt to ER/ [facility] for evaluation today.  Pt asks if [employer] will pay for [facility] and a CT there.  Explained triage nurse cannot quote extent of insurance coverage but if seen in [facility]/ ER, insurance usually covers urgent/emergent exams and diagnostics as is medically necessary.     Patient stated she had to cx her earlier April appt to go to [place], then was told she could see Dr Lindo on 4/21 but headaches now more painful and frequent. has appt w/ Dr Lindo.  (No appts are noted as booked for this pt on 4/21).    Pt states she wants Dr Lindo to decide if she should go to the [facility] or the ER and if someone calls her back to tell her-- she will go where he decides she should go.   Message routed high priority to PCP/ Dr Lindo for response.  __________________    Reason for Disposition   SEVERE headache, sudden-onset (i.e., reaching maximum intensity within seconds to 1 hour)    Additional Information   Negative: Stiff neck (can't touch chin to chest)     Cannot touch chin to chest but has been unable to do that for many years bc of an old neck injury, muscles cannot bend neck all the way to chest.   Commented on: Weakness of the face, arm or leg on one side of the body and new-onset     No denies weakness, numbness/ tingling, speech issues, facial drooping. Cannot touch chin to chest but has been unable to do that for many years bc of an old neck injury    Answer Assessment - Initial Assessment Questions  1. LOCATION: \"Where does it hurt?\"       Starts right side of neck and extends the on right side of head above ear  2. ONSET: \"When did the  headache start?\" (Minutes, hours or days)       Started ~  1 year ago but headache has worsened this past 1-2 wks    3. PATTERN: \"Does the pain come and go, or has it been constant since it started?\"   Intermittently-- every 2 weeks or monthly; lasts 30 minutes.  Except this past week-- headaches daily, 10/10.        4. SEVERITY: \"How bad is the pain?\" and \"What does it keep you from doing?\"  (e.g., Scale 1-10; mild, moderate, or severe)     - SEVERE (8-10): excruciating pain, unable to do any normal activities      States severe, 10/10 and must lay down until it passes.     5. RECURRENT SYMPTOM: \"Have you ever had headaches before?\" If Yes, ask: \"When was the last time?\" and \"What happened that time?\"       Yes, every 1-2 wks.   Since 4/9, getting headaches daily.  Has HA today 10/10.  Wants to have a CT scan of her head.      6. CAUSE: \"What do you think is causing the headache?\" Not sure        7. MIGRAINE: \"Have you been diagnosed with migraine headaches?\" If Yes, ask: \"Is this headache similar?\"       No migraine dx previously.  8. HEAD INJURY: \"Has there been any recent injury to the head?\"       No  9. OTHER SYMPTOMS: \"Do you have any other symptoms?\" (fever, stiff neck, eye pain, sore throat, cold symptoms)   Takes gabapentin 400 mg daily for fibromyalgia-- if she gets a headache on any day-- she takes an extra  dose, total 800 mg/ day.  HA better in 1 hr.    10. PREGNANCY: \"Is there any chance you are pregnant?\" \"When was your last menstrual period?\"        DNA, post menopausal    Protocols used: Headache-A-OH

## 2025-04-15 NOTE — TELEPHONE ENCOUNTER
Pt called requesting partial refills for hydroCHLOROthiazide 12.5 MG Oral Tab   ALPRAZolam 0.25 MG Oral Tab   metoprolol succinate ER 50 MG Oral Tablet 24 Hr     Pt has an appt sched for 06/03  Please inform pt when medication has been sent

## 2025-04-18 NOTE — TELEPHONE ENCOUNTER
Pharmacy requesting a change on her Rx from freestyle to doris
Bed in lowest position, wheels locked, appropriate side rails in place/Call bell, personal items and telephone in reach/Instruct patient to call for assistance before getting out of bed or chair/Non-slip footwear when patient is out of bed/[place] to call system/Physically safe environment - no spills, clutter or unnecessary equipment/Purposeful Proactive Rounding/Room/bathroom lighting operational, light cord in reach

## 2025-05-19 NOTE — TELEPHONE ENCOUNTER
Daughter called requesting a refill for pts bp medication  Pt forgot her medication and is currently in [place]  If possible to send over partial refills up to June 10th  To [facility] in [place]    Please advise

## 2025-05-28 NOTE — PROGRESS NOTES
[facility] part of [facility]  Return Patient Progress Note      HPI:     Chief Complaint   Patient presents with    Follow - Up     6 months follow up    Shoulder Pain     Bilateral shoulder pain traveling to her head        Fernando Dhillon is a 69 year old female presenting for:    Has a significant  has a past medical history of Arthritis, Breast cyst, Colon adenoma (05/25/2022), Colon polyp, Esophageal reflux, Fibromyalgia, High blood pressure, High cholesterol, Ovarian cyst, and Prediabetes.    69-year-old patient with a history of controlled type 2 diabetes, hypertension, anxiety, and neuropathy, presented today for follow-up, primarily complaining of back pain.    The patient reports experiencing back pain for an extended period, though the exact onset is not specified. The pain is localized to the back and radiates up to the head, particularly on the right side. The patient mentions having a previous MRI of the neck, but has not followed up with the specialist since then. No specific aggravating or alleviating factors are mentioned. The patient describes the pain as significant, stating \"I'm in pain\" and \"nothing but pain\" when asked about their general well-being. The impact on daily functioning is not explicitly stated, but the patient's emphasis on the pain suggests it may be affecting their quality of life.    In terms of overall health status, the patient's blood sugar levels appear to be stable, with the clinician noting a current level of 7.1, which is consistent with previous measurements. The patient continues to take their prescribed medications, including metformin for diabetes, hydrochlorothiazide and metoprolol for hypertension, alprazolam as needed for anxiety, and gabapentin and duloxetine for neuropathy. Adherence to the medication regimen is not explicitly discussed but seems to be implied.    The patient mentions a previous fall that resulted in a bone abnormality,  pointing out to the clinician that one bone appears higher than another. This past injury may be contributing to the current pain experience. The patient is scheduled for a bone density scan (Dexascan) to further evaluate their bone health and potential osteoporosis.    Medical History:  - Anxiety  - Type 2 diabetes, controlled  - Hypertension   - Neuropathy    Medications:  - Metformin for diabetes  - Hydrochlorothiazide for hypertension  - Metoprolol for hypertension  - Alprazolam 0.25 mg as needed for anxiety  - Gabapentin for neuropathy  - Duloxetine for neuropathy  - Atorvastatin for cholesterol    Social History:  - Lives at home  - Has sisters living in [place]  - Planning a 3-week trip to [place], [place]  - Attending a nephew's wedding during the trip    Review of Systems:  - Musculoskeletal: Positive for back pain  - Neurological: Positive for pain radiating to the head, more on the right side      Labs:   CMP:  Lab Results   Component Value Date/Time     05/14/2025 11:56 AM    K 4.0 05/14/2025 11:56 AM     05/14/2025 11:56 AM    CO2 27.0 05/14/2025 11:56 AM    CREATSERUM 0.85 05/14/2025 11:56 AM    CA 10.4 05/14/2025 11:56 AM     (H) 05/14/2025 11:56 AM    TP 7.3 05/14/2025 11:56 AM    ALB 4.9 (H) 05/14/2025 11:56 AM    ALKPHO 83 05/14/2025 11:56 AM    AST 27 05/14/2025 11:56 AM    ALT 27 05/14/2025 11:56 AM    BILT 0.7 05/14/2025 11:56 AM    TSH 2.507 04/10/2024 10:26 AM    T4F 1.0 04/10/2024 10:26 AM        CBC:  Lab Results   Component Value Date    WBC 6.5 05/14/2025    HGB 13.0 05/14/2025    HCT 39.6 05/14/2025    .0 05/14/2025    NEPERCENT 60.5 05/14/2025    LYPERCENT 29.2 05/14/2025    MOPERCENT 7.4 05/14/2025    EOPERCENT 2.3 05/14/2025    BAPERCENT 0.3 05/14/2025    NE 3.91 05/14/2025    LYMABS 1.89 05/14/2025    MOABSO 0.48 05/14/2025    EOABSO 0.15 05/14/2025    BAABSO 0.02 05/14/2025          Hemoglobin A1C, Microalbumin  Lab Results   Component Value  Date/Time    A1C 7.2 (A) 05/14/2025 10:59 AM        Lipid panel  Lab Results   Component Value Date/Time    CHOLEST 168 05/14/2025 11:56 AM    HDL 64 (H) 05/14/2025 11:56 AM    TRIG 159 (H) 05/14/2025 11:56 AM    LDL 77 05/14/2025 11:56 AM    NONHDLC 104 05/14/2025 11:56 AM        Medications:  Current Medications[1]   PMH:  Past Medical History[2]            REVIEW OF SYSTEMS:   Review of Systems   Constitutional:  Negative for chills, fatigue, fever and unexpected weight change.   HENT:  Negative for congestion, ear pain, hearing loss, rhinorrhea, sinus pain and sore throat.    Eyes:  Negative for pain, redness and visual disturbance.   Respiratory:  Negative for apnea, cough, chest tightness, shortness of breath and wheezing.    Cardiovascular:  Negative for chest pain, palpitations and leg swelling.   Gastrointestinal:  Negative for abdominal distention, abdominal pain, blood in stool, constipation and nausea.   Endocrine: Negative for cold intolerance, heat intolerance and polyuria.   Genitourinary:  Negative for dysuria, hematuria and urgency.   Musculoskeletal:  Negative for arthralgias, back pain, gait problem, joint swelling, myalgias and neck pain.   Skin:  Negative for rash and wound.   Allergic/Immunologic: Negative for food allergies and immunocompromised state.   Neurological:  Negative for dizziness, seizures, facial asymmetry, speech difficulty, weakness, light-headedness, numbness and headaches.   Hematological:  Negative for adenopathy. Does not bruise/bleed easily.   Psychiatric/Behavioral:  Negative for behavioral problems, sleep disturbance and suicidal ideas. The patient is not nervous/anxious.             PHYSICAL EXAM:   /60   Pulse 83   Temp 97.2 °F (36.2 °C)   Ht 5' 4\" (1.626 m)   Wt 159 lb (72.1 kg)   SpO2 97%   BMI 27.29 kg/m²  Estimated body mass index is 27.29 kg/m² as calculated from the following:    Height as of this encounter: 5' 4\" (1.626 m).    Weight as of this  encounter: 159 lb (72.1 kg).     Wt Readings from Last 3 Encounters:   05/14/25 159 lb (72.1 kg)   10/10/24 169 lb (76.7 kg)   04/10/24 171 lb (77.6 kg)       Physical Exam  Vitals reviewed.   Constitutional:       General: She is not in acute distress.     Appearance: She is well-developed.   HENT:      Head: Normocephalic and atraumatic.   Eyes:      Conjunctiva/sclera: Conjunctivae normal.      Pupils: Pupils are equal, round, and reactive to light.   Neck:      Thyroid: No thyromegaly.   Cardiovascular:      Rate and Rhythm: Normal rate and regular rhythm.      Heart sounds: Normal heart sounds, S1 normal and S2 normal. No murmur heard.     No friction rub. No gallop.   Pulmonary:      Effort: Pulmonary effort is normal. No respiratory distress.      Breath sounds: Normal breath sounds. No wheezing or rales.   Chest:      Chest wall: No tenderness.   Abdominal:      General: Bowel sounds are normal. There is no distension.      Palpations: Abdomen is soft. There is no mass.      Tenderness: There is no abdominal tenderness. There is no guarding or rebound.   Musculoskeletal:         General: No tenderness. Normal range of motion.      Cervical back: Normal range of motion.   Lymphadenopathy:      Cervical: No cervical adenopathy.   Skin:     General: Skin is warm.      Findings: No erythema or rash.   Neurological:      Mental Status: She is alert and oriented to person, place, and time.      Cranial Nerves: No cranial nerve deficit.      Deep Tendon Reflexes: Reflexes are normal and symmetric.   Psychiatric:         Behavior: Behavior normal.         Thought Content: Thought content normal.         Judgment: Judgment normal.               ASSESSMENT AND PLAN:   Patient is a 69 year old female who presents primarily presents for:  Assessment and Plan:    1. Chronic Back Pain:     - Ongoing back pain, extends to head (particularly right side)     - Previous neck MRI performed, no specialist follow-up     -  Possible osteoporosis contribution     - Palpable bony prominence noted, possibly related to previous fall     - Order bone density scan (DEXA) to evaluate for osteoporosis     - Continue current pain management: gabapentin and duloxetine     - Recommend follow-up with specialist for neck MRI review     - Educate on potential osteoporosis-chronic pain relationship     - Reassess pain management plan after bone density scan results    2. Type 2 Diabetes Mellitus:     - Well-controlled, recent HbA1c 7.1% (stable from previous 7.2%)     - Continue metformin (current dose)     - Order comprehensive metabolic panel     - Reinforce importance of regular follow-ups and blood glucose monitoring    3. Hypertension:     - No specific concerns mentioned     - Continue hydrochlorothiazide and metoprolol (current doses)     - Monitor blood pressure at follow-up visits    4. Hyperlipidemia:     - No specific concerns mentioned     - Continue atorvastatin (current dose)     - Order lipid panel to assess current cholesterol levels    5. Preventive Care:     - Due for ophthalmology evaluation and mammogram     - Encourage ophthalmology appointment scheduling next month     - Provide referral for mammogram with Dr. Ruby     - Order comprehensive lab work (including thyroid function and vitamin levels)     - Instruct patient to obtain lab work at Issue for cost-effectiveness            Health Maintenance:    Health Maintenance   Topic Date Due    Zoster Vaccines (1 of 2) Never done    DEXA Scan  Never done    Mammogram  05/18/2024    COVID-19 Vaccine (4 - 2024-25 season) 09/01/2024    Diabetes Care Foot Exam  10/05/2024    Annual Well Visit  01/01/2025    Annual Depression Screening  01/01/2025    Fall Risk Screening (Annual)  01/01/2025    Diabetes Care Dilated Eye Exam  01/08/2025    Diabetes Care A1C  11/14/2025    Diabetes Care: GFR  05/14/2026    Colorectal Cancer Screening  05/25/2029    Influenza Vaccine  Completed     Diabetes Care: Microalb/Creat Ratio (Annual)  Completed    Pneumococcal Vaccine: 50+ Years  Completed    Meningococcal B Vaccine  Aged Out         Meds & Refills for this Visit:  Requested Prescriptions     Signed Prescriptions Disp Refills    Omeprazole 40 MG Oral Capsule Delayed Release 90 capsule 1     Sig: Take 1 capsule (40 mg total) by mouth daily.    hydroCHLOROthiazide 12.5 MG Oral Tab 90 tablet 1     Sig: Take 1 tablet (12.5 mg total) by mouth daily.    ALPRAZolam 0.25 MG Oral Tab 30 tablet 0     Sig: Take 1 tablet (0.25 mg total) by mouth daily as needed for Anxiety.    cyclobenzaprine 5 MG Oral Tab 120 tablet 0     Sig: Start cyclobenzaprine 5 mg 1-2 tablets three times per day as needed for spasms. Do not operate heavy machinery while on this medication as it may make you sleepy.    metFORMIN 500 MG Oral Tab 180 tablet 1     Sig: Take 1 tablet (500 mg total) by mouth 2 (two) times daily.    naproxen 500 MG Oral Tab 60 tablet 0     Sig: TAKE 1 TABLET BY MOUTH TWICE DAILY WITH MEALS. TAKE FOR 2 WEEKS AS DIRECTED AND THEN AS NEEDED       Orders Placed This Encounter   Procedures    Microalb/Creat Ratio, Random Urine    POC Glycohemoglobin [61311]    Comp Metabolic Panel (14)    CBC With Differential With Platelet    Lipid Panel       Imaging & Consults:  OPHTHALMOLOGY - INTERNAL  SUSANNE SAVI 2D+3D SCREENING BILAT (CPT=77067/93670)  XR DEXA BONE DENSITOMETRY (CPT=77080)          No follow-ups on file.  Important follow up notes/labs for next visit      Patient indicates understanding of the above recommendations and agrees to the above plan.  Reasurrance and education provided. All questions answered.    Notified to call with any questions, complications, allergies, or worsening or changing symptoms as well as any side effects or complications from the treatments .  Red flags/ ER precautions discussed.    If diagnostic labs or imaging ordered advised patient to contact my office for results  24-48 hours after  completion    This note was dictated using dragon speech recognition transcription software.  Typographical and transcription errors may be present.  Please call if any questions.       As part of our commitment to providing you with comprehensive, transparent, and timely access to your health information, we adhere to the guidelines set forth by the 21st Century Cures Act. This Act enhances your rights to access your electronic health information and ensures that you can easily obtain your medical records.  Please note that the verbage used in this note is intended for medical documentation and communication and may be interpreted as forthright.  Please do not hesitate to contact my office if you have any questions.            Carlos Lindo MD  [facility]                         [1]   Current Outpatient Medications   Medication Sig Dispense Refill    Omeprazole 40 MG Oral Capsule Delayed Release Take 1 capsule (40 mg total) by mouth daily. 90 capsule 1    hydroCHLOROthiazide 12.5 MG Oral Tab Take 1 tablet (12.5 mg total) by mouth daily. 90 tablet 1    ALPRAZolam 0.25 MG Oral Tab Take 1 tablet (0.25 mg total) by mouth daily as needed for Anxiety. 30 tablet 0    cyclobenzaprine 5 MG Oral Tab Start cyclobenzaprine 5 mg 1-2 tablets three times per day as needed for spasms. Do not operate heavy machinery while on this medication as it may make you sleepy. 120 tablet 0    metFORMIN 500 MG Oral Tab Take 1 tablet (500 mg total) by mouth 2 (two) times daily. 180 tablet 1    naproxen 500 MG Oral Tab TAKE 1 TABLET BY MOUTH TWICE DAILY WITH MEALS. TAKE FOR 2 WEEKS AS DIRECTED AND THEN AS NEEDED 60 tablet 0    gabapentin 400 MG Oral Cap TOME 1 CAPSULA  POR LA BOCA 3 VECES AL BOB 90 capsule 0    DULOXETINE 60 MG Oral Cap DR Particles TOME 1 CAPSULA POR LA BOCA DIARIAMENTE 90 capsule 0    atorvastatin 10 MG Oral Tab Take 1 tablet (10 mg total) by mouth nightly. 90 tablet 1    Glucose Blood (ACCU-CHEK GUIDE) In Vitro Strip Check  sugars once daily 100 strip 3    Accu-Chek FastClix Lancets Does not apply Misc 1 Lancet by Finger stick route in the morning and 1 Lancet before bedtime. One lancet daily. 100 each 3    triamcinolone 0.1 % External Cream Apply topically 2 (two) times daily as needed. 60 g 3    metoprolol succinate ER 50 MG Oral Tablet 24 Hr Take 1 tablet (50 mg total) by mouth daily. 30 tablet 0   [2]   Past Medical History:   Arthritis    Breast cyst    Colon adenoma    x1    Colon polyp    Esophageal reflux    Fibromyalgia    High blood pressure    High cholesterol    Ovarian cyst    Prediabetes

## 2025-06-02 NOTE — TELEPHONE ENCOUNTER
Results were given and recommendations were made, no further questions at this time. Patient verbalized understanding.

## 2025-06-02 NOTE — TELEPHONE ENCOUNTER
----- Message from Carlos Lindo sent at 5/28/2025  2:03 PM CDT -----  Inform that lipid panel with mildly elevated triglycerides otherwise level well controlled.  Increase consumption of vegetables, fruits, herbs, nuts, beans and whole grains.  Decrease intake of   saturated fats, processed foods, meats and sweets.  Moderate amounts of dairy, poultry and seafood.    Rest of labs normal.    ----- Message -----  From: Pricilla Bhagat  Sent: 5/14/2025  10:59 AM CDT  To: Carlos Lindo MD

## 2025-06-14 NOTE — PROGRESS NOTES
Subjective:   Fernando Dhillon is a 69 year old female who presents for a MA AHA (Medicare Advantage Annual Health Assessment) and Subsequent Annual Wellness visit (Pt already had Initial Annual Wellness) and scheduled follow up of multiple significant but stable problems.               History/Other:   Fall Risk Assessment:   She has been screened for Falls and is low risk.      Cognitive Assessment:   She had a completely normal cognitive assessment - see flowsheet entries     Functional Ability/Status:   Fernando Dhillon has some abnormal functions as listed below:  She has Vision problems based on screening of functional status.       Depression Screening (PHQ):  PHQ-2 SCORE: 2  , done 6/11/2025   Little interest or pleasure in doing things: 1    Feeling down, depressed, or hopeless: 1              Advanced Directives:   She does NOT have a Living Will. [Do you have a living will?: No]  She does NOT have a Power of  for Health Care. [Do you have a healthcare power of ?: No]  Not discussed      Patient Active Problem List   Diagnosis    Fibromyalgia    Anxiety    Essential hypertension    Breast cyst    Mild recurrent major depression     Allergies:  She has no known allergies.    Current Medications:  Outpatient Medications Marked as Taking for the 6/11/25 encounter (Office Visit) with Carlos Lindo MD   Medication Sig    DULoxetine 60 MG Oral Cap DR Particles Take 1 capsule (60 mg total) by mouth daily.    ondansetron (ZOFRAN) 4 mg tablet Take 1 tablet (4 mg total) by mouth 2 (two) times daily as needed for Nausea.    clotrimazole 1 % External Cream Apply 1 Application topically 2 (two) times daily.    metoprolol succinate ER 50 MG Oral Tablet 24 Hr Take 1 tablet (50 mg total) by mouth daily.    Omeprazole 40 MG Oral Capsule Delayed Release Take 1 capsule (40 mg total) by mouth daily.    hydroCHLOROthiazide 12.5 MG Oral Tab Take 1 tablet (12.5 mg total) by mouth daily.    ALPRAZolam 0.25 MG  Oral Tab Take 1 tablet (0.25 mg total) by mouth daily as needed for Anxiety.    cyclobenzaprine 5 MG Oral Tab Start cyclobenzaprine 5 mg 1-2 tablets three times per day as needed for spasms. Do not operate heavy machinery while on this medication as it may make you sleepy.    metFORMIN 500 MG Oral Tab Take 1 tablet (500 mg total) by mouth 2 (two) times daily.    naproxen 500 MG Oral Tab TAKE 1 TABLET BY MOUTH TWICE DAILY WITH MEALS. TAKE FOR 2 WEEKS AS DIRECTED AND THEN AS NEEDED    gabapentin 400 MG Oral Cap TOME 1 CAPSULA  POR LA BOCA 3 VECES AL BOB    atorvastatin 10 MG Oral Tab Take 1 tablet (10 mg total) by mouth nightly.    Glucose Blood (ACCU-CHEK GUIDE) In Vitro Strip Check sugars once daily    Accu-Chek FastClix Lancets Does not apply Misc 1 Lancet by Finger stick route in the morning and 1 Lancet before bedtime. One lancet daily.    triamcinolone 0.1 % External Cream Apply topically 2 (two) times daily as needed.       Medical History:  She  has a past medical history of Arthritis, Breast cyst, Colon adenoma (2022), Colon polyp, Esophageal reflux, Fibromyalgia, High blood pressure, High cholesterol, Ovarian cyst, and Prediabetes.  Surgical History:  She  has a past surgical history that includes hysterectomy (); d & c ();  ();  ();  ();  ();  (); cholecystectomy (); neva localization wire 1 site right (cpt=19281); colonoscopy; and colonoscopy (2022).   Family History:  Her family history is not on file.  Social History:  She  reports that she has never smoked. She has never used smokeless tobacco. She reports that she does not drink alcohol and does not use drugs.    Tobacco:  Non smoker    CAGE Alcohol Screen:   CAGE screening score of 0 on 2025, showing low risk of alcohol abuse.      Patient Care Team:  Carlos Lindo MD as PCP - General (Internal Medicine)    Review of Systems  GENERAL: feels well otherwise  SKIN: denies any  unusual skin lesions  EYES: denies blurred vision or double vision  HEENT: denies nasal congestion, sinus pain or ST  LUNGS: denies shortness of breath with exertion  CARDIOVASCULAR: denies chest pain on exertion  GI: denies abdominal pain, denies heartburn  : denies dysuria, vaginal discharge or itching, no complaint of urinary incontinence   MUSCULOSKELETAL: denies back pain  NEURO: denies headaches  PSYCHE: denies depression or anxiety  HEMATOLOGIC: denies hx of anemia  ENDOCRINE: denies thyroid history  ALL/ASTHMA: denies hx of allergy or asthma    Objective:   Physical Exam  General Appearance:  Alert, cooperative, no distress, appears stated age   Head:  Normocephalic, without obvious abnormality, atraumatic   Eyes:  PERRL, conjunctiva/corneas clear, EOM's intact both eyes   Ears:  Normal TM's and external ear canals, both ears   Nose: Nares normal, septum midline,mucosa normal, no drainage or sinus tenderness   Throat: Lips, mucosa, and tongue normal; teeth and gums normal   Neck: Supple, symmetrical, trachea midline, no adenopathy;  thyroid: not enlarged, symmetric, no tenderness/mass/nodules; no carotid bruit or JVD   Back:   Symmetric, no curvature, ROM normal, no CVA tenderness   Lungs:   Clear to auscultation bilaterally, respirations unlabored   Heart:  Regular rate and rhythm, S1 and S2 normal, no murmur, rub, or gallop   Abdomen:   Soft, non-tender, bowel sounds active all four quadrants,  no masses, no organomegaly   Pelvic: Deferred   Extremities: Extremities normal, atraumatic, no cyanosis or edema   Pulses: 2+ and symmetric   Skin: Skin color, texture, turgor normal, no rashes or lesions   Lymph nodes: Cervical, supraclavicular, and axillary nodes normal   Neurologic: Normal       /78   Pulse 90   Temp 97 °F (36.1 °C)   Ht 5' 4\" (1.626 m)   Wt 173 lb 3.2 oz (78.6 kg)   SpO2 96%   BMI 29.73 kg/m²  Estimated body mass index is 29.73 kg/m² as calculated from the following:    Height as  of this encounter: 5' 4\" (1.626 m).    Weight as of this encounter: 173 lb 3.2 oz (78.6 kg).    Medicare Hearing Assessment:   Hearing Screening    Screening Method: Whisper Test  Whisper Test Result: Pass         Visual Acuity:   Right Eye Visual Acuity: Corrected Right Eye Chart Acuity: 20/40   Left Eye Visual Acuity: Corrected Left Eye Chart Acuity: 20/400   Both Eyes Visual Acuity: Corrected Both Eyes Chart Acuity: 20/25   Able To Tolerate Visual Acuity: Yes        Assessment & Plan:   Fernando Dhillon is a 69 year old female who presents for a Medicare Assessment.     1. Medicare annual wellness visit, subsequent (Primary)-  During the annual physical exam I spent extensive time discussing medical history including existing conditions, past surgeries, allergies and medications.  Lifestyle factors discussed including diet, exercise and substance abuse including alcohol and tobacco if warranted.  Counseled on screening tests based on age and underlying risk factors which may include but not limited to blood pressure measurement, cholesterol screening, diabetes screening and prostate cancer screening.  Preventative screenings discussed.  Reviewed immunizations.  Sexual health discussed if appropriate.  Family history reviewed.  I addressed any specific concerns or symptoms that the patient had.      2. Anxiety  -     DULoxetine HCl; Take 1 capsule (60 mg total) by mouth daily.  Dispense: 90 capsule; Refill: 1    3. Mild recurrent major depression  -     DULoxetine HCl; Take 1 capsule (60 mg total) by mouth daily.  Dispense: 90 capsule; Refill: 1    4. Essential hypertension  Controlled.  On hydrochlorothiazide, metoprolol    5. Fibromyalgia  -  Stable  - Duloxetine    6. Cyst of breast, unspecified laterality  - stable     7. Encounter for annual health examination  Other orders  -     Ondansetron HCl; Take 1 tablet (4 mg total) by mouth 2 (two) times daily as needed for Nausea.  Dispense: 20 tablet; Refill: 1  -      Clotrimazole; Apply 1 Application topically 2 (two) times daily.  Dispense: 1 each; Refill: 2    8.  Nausea  Zofran PRN.             The patient indicates understanding of these issues and agrees to the plan.  Reinforced healthy diet, lifestyle, and exercise.      Return in about 6 months (around 12/11/2025).     Carlos Lindo MD, 6/14/2025     Supplementary Documentation:   General Health:  In the past six months, have you lost more than 10 pounds without trying?: 2 - No  Has your appetite been poor?: No  Type of Diet: Balanced  How does the patient maintain a good energy level?: Stretching, Appropriate Exercise, Daily Walks  How would you describe your daily physical activity?: Light  How would you describe your current health state?: Good  How do you maintain positive mental well-being?: Visiting Friends, Social Interaction, Visiting Family  On a scale of 0 to 10, with 0 being no pain and 10 being severe pain, what is your pain level?: 0 - (None)  In the past six months, have you experienced urine leakage?: 0-No  At any time do you feel concerned for the safety/well-being of yourself and/or your children, in your home or elsewhere?: No  Have you had any immunizations at another office such as Influenza, Hepatitis B, Tetanus, or Pneumococcal?: No    Health Maintenance   Topic Date Due    Zoster Vaccines (1 of 2) Never done    DEXA Scan  Never done    Mammogram  05/18/2024    COVID-19 Vaccine (4 - 2024-25 season) 09/01/2024    Diabetes Care Foot Exam  10/05/2024    Annual Well Visit  01/01/2025    Diabetes Care Dilated Eye Exam  01/08/2025    Diabetes Care A1C  11/14/2025    Diabetes Care: GFR  05/14/2026    Colorectal Cancer Screening  05/25/2029    Influenza Vaccine  Completed    Annual Depression Screening  Completed    Fall Risk Screening (Annual)  Completed    Diabetes Care: Microalb/Creat Ratio (Annual)  Completed    Pneumococcal Vaccine: 50+ Years  Completed    Meningococcal B Vaccine  Aged Out

## 2025-07-08 NOTE — TELEPHONE ENCOUNTER
05/14/2025  LAST WRITTEN: 05/14/2025  Quantity: 30    Recent Visits  Date Type Provider Dept   06/11/25 Office Visit Carlos Lindo MD [address]

## 2025-07-08 NOTE — TELEPHONE ENCOUNTER
Pt called requesting refills for ALPRAZolam 0.25 MG Oral Tab   Pt traveling tomorrow and will need this medication

## (undated) DIAGNOSIS — M79.7 FIBROMYALGIA: Primary | ICD-10-CM

## (undated) DIAGNOSIS — Z86.010 HISTORY OF COLON POLYPS: ICD-10-CM

## (undated) DIAGNOSIS — Z12.11 SCREEN FOR COLON CANCER: Primary | ICD-10-CM

## (undated) NOTE — LETTER
[address]  Authorization for Invasive Procedure                                                                                           I hereby authorize Kyra Sierra MD, my physician and his/her assistants (if applicable), which may include medical students, residents, and/or fellows, to perform the following surgical operation/ procedure and administer such anesthesia as may be determined necessary by my physician: Operation/Procedure name (s) Bilateral C3-4, C4-5 facet joint injections on Fernando Dhillon   2. I recognize that during the surgical operation/procedure, unforeseen conditions may necessitate additional or different procedures than those listed above. I, therefore, further authorize and request that the above-named surgeon, assistants, or designees perform such procedures as are, in their judgment, necessary and desirable. 3.   My surgeon/physician has discussed prior to my surgery the potential benefits, risks and side effects of this procedure; the likelihood of achieving goals; and potential problems that might occur during recuperation. They also discussed reasonable alternatives to the procedure, including risks, benefits, and side effects related to the alternatives and risks related to not receiving this procedure. I have had all my questions answered and I acknowledge that no guarantee has been made as to the result that may be obtained. 4.   Should the need arise during my operation/procedure, which includes change of level of care prior to discharge, I also consent to the administration of blood and/or blood products. Further, I understand that despite careful testing and screening of blood or blood products by collecting agencies, I may still be subject to ill effects as a result of receiving a blood transfusion and/or blood products.   The following are some, but not all, of the potential risks that can occur: fever and allergic reactions, hemolytic reactions, transmission of diseases such as Hepatitis, AIDS and Cytomegalovirus (CMV) and fluid overload. In the event that I wish to have an autologous transfusion of my own blood, or a directed donor transfusion, I will discuss this with my physician. Check only if Refusing Blood or Blood Products  I understand refusal of blood or blood products as deemed necessary by my physician may have serious consequences to my condition to include possible death. I hereby assume responsibility for my refusal and release the hospital, its personnel, and my physicians from any responsibility for the consequences of my refusal.    o  Refuse   5. I authorize the use of any specimen, organs, tissues, body parts or foreign objects that may be removed from my body during the operation/procedure for diagnosis, research or teaching purposes and their subsequent disposal by hospital authorities. I also authorize the release of specimen test results and/or written reports to my treating physician on the hospital medical staff or other referring or consulting physicians involved in my care, at the discretion of the Pathologist or my treating physician. 6.   I consent to the photographing or videotaping of the operations or procedures to be performed, including appropriate portions of my body for medical, scientific, or educational purposes, provided my identity is not revealed by the pictures or by descriptive texts accompanying them. If the procedure has been photographed/videotaped, the surgeon will obtain the original picture, image, videotape or CD. The hospital will not be responsible for storage, release or maintenance of the picture, image, tape or CD.    7.   I consent to the presence of a  or observers in the operating room as deemed necessary by my physician or their designees.     8.   I recognize that in the event my procedure results in extended X-Ray/fluoroscopy time, I may develop a skin reaction. 9. If I have a Do Not Attempt Resuscitation (DNAR) order in place, that status will be suspended while in the operating room, procedural suite, and during the recovery period unless otherwise explicitly stated by me (or a person authorized to consent on my behalf). The surgeon or my attending physician will determine when the applicable recovery period ends for purposes of reinstating the DNAR order. 10. Patients having a sterilization procedure: I understand that if the procedure is successful the results will be permanent and it will therefore be impossible for me to inseminate, conceive, or bear children. I also understand that the procedure is intended to result in sterility, although the result has not been guaranteed. 11. I acknowledge that my physician has explained sedation/analgesia administration to me including the risk and benefits I consent to the administration of sedation/analgesia as may be necessary or desirable in the judgment of my physician. I CERTIFY THAT I HAVE READ AND FULLY UNDERSTAND THE ABOVE CONSENT TO OPERATION and/or OTHER PROCEDURE.     _________________________________________ _________________________________     ___________________________________  Signature of Patient     Signature of Responsible Person                   Printed Name of Responsible Person                              _________________________________________ ______________________________        ___________________________________  Signature of Witness         Date  Time         Relationship to Patient    STATEMENT OF PHYSICIAN My signature below affirms that prior to the time of the procedure; I have explained to the patient and/or his/her legal representative, the risks and benefits involved in the proposed treatment and any reasonable alternative to the proposed treatment.  I have also explained the risks and benefits involved in refusal of the proposed treatment and alternatives to the proposed treatment and have answered the patient's questions.  If I have a significant financial interest in a co-management agreement or a significant financial interest in any product or implant, or other significant relationship used in this procedure/surgery, I have disclosed this and had a discussion with my patient.     _______________________________________________________________ _____________________________  Lima Grief of Physician)                                                                                         (Date)                                   (Time)  Patient Name: Faby Soni    : 9/15/1955   Printed: 2023      Medical Record #: [id number]                                              Page 1 of 1

## (undated) NOTE — LETTER
[facility]  [address]  [facility] [address]  Ph:[phone]  Fax:[phone]         Patient Information:  Patient Name:   Address: Fernando Dhillon, ([id number])  [address] [place] [address]  [phone] (home)  Sex: female          : 9/15/1955   ________________________________________________________________  Referral Information:  Order Date: May 25, 2023  Expected Date: 2023  Expiration Date: May 25, 2024 Epic Order #: [id number]   Referral Type: MRI SPINE CERVICAL (CPT=72141) Dx: Cervical spondylosis without myelopathy (M47.812)  Trigger point of neck (M54.2)  Tendinopathy of rotator cuff, unspecified laterality (M67.919)  DDD (degenerative disc disease), cervical (M50.30)  Cervical facet syndrome (M47.812)  Chronic pain of both shoulders (M25.511,G89.29,M25.512)  Cervical radiculopathy (M54.12)  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 Neck pain; Neurologic deficit, non-traumatic; UE weakness; No known/automatically detected potential contraindications to imaging     Number of Visits: 1           ______________________________________________________________  Scheduling Information:  Your order will generate a \"Scheduling Ticket\" that will be available in [employer] to schedule on your own at a time most convenient to you. To ensure you receive your test in a timely manner, STAT orders will not generate a ticket and must be scheduled by calling the Central Scheduling department.     Your physician has ordered a radiology test that may require authorization from your insurance company.  Your physician or the clinic staff will work with your insurance company to obtain this authorization for your ordered radiology test.     If you do not have a [employer] Account, or if you prefer  to speak with someone to schedule your appointment, please call [facility] Central Scheduling at [phone].     _______________________________________________________        Coverage Information:      Active Insurance as of 5/25/2023      Patient has no active insurance coverage on file for 5/25/2023.          Electronically Signed By: Behar, Alex, MD [NPI: [id number]]  Order Date: May 25, 2023 at 9:38 AM

## (undated) NOTE — Clinical Note
Dear Dr. Tone Peacock, Thank you for the referral and allowing me to participate in the care of Marly Denton. Please call me with any questions at [phone]  Julio Quintana MD, [address] [facility] Physical Medicine and Rehabilitation/[facility]